# Patient Record
Sex: FEMALE | Race: WHITE | Employment: FULL TIME | URBAN - METROPOLITAN AREA
[De-identification: names, ages, dates, MRNs, and addresses within clinical notes are randomized per-mention and may not be internally consistent; named-entity substitution may affect disease eponyms.]

---

## 2017-03-31 ENCOUNTER — OFFICE VISIT (OUTPATIENT)
Dept: FAMILY MEDICINE | Facility: CLINIC | Age: 52
End: 2017-03-31

## 2017-03-31 VITALS
HEART RATE: 59 BPM | SYSTOLIC BLOOD PRESSURE: 112 MMHG | BODY MASS INDEX: 22.63 KG/M2 | WEIGHT: 136 LBS | OXYGEN SATURATION: 98 % | DIASTOLIC BLOOD PRESSURE: 72 MMHG

## 2017-03-31 DIAGNOSIS — N89.8 VAGINAL ITCHING: ICD-10-CM

## 2017-03-31 DIAGNOSIS — K62.89 RECTAL IRRITATION: ICD-10-CM

## 2017-03-31 DIAGNOSIS — R30.0 DYSURIA: Primary | ICD-10-CM

## 2017-03-31 LAB
BILIRUBIN UR: NEGATIVE
BLOOD UR: ABNORMAL
GLUCOSE URINE: NEGATIVE
KETONES UR QL: NEGATIVE
LEUKOCYTE ESTERASE UR: ABNORMAL
NITRITE UR QL STRIP: NEGATIVE
PH UR STRIP: 5.5 [PH] (ref 5–7)
PROTEIN UR: NEGATIVE
SP GR UR STRIP: 1.02
UROBILINOGEN UR STRIP-ACNC: ABNORMAL

## 2017-03-31 RX ORDER — NYSTATIN 100000 U/G
OINTMENT TOPICAL 2 TIMES DAILY
Qty: 30 G | Refills: 1 | Status: SHIPPED | OUTPATIENT
Start: 2017-03-31 | End: 2018-05-01

## 2017-03-31 RX ORDER — FLUCONAZOLE 150 MG/1
150 TABLET ORAL ONCE
Qty: 1 TABLET | Refills: 1 | Status: SHIPPED | OUTPATIENT
Start: 2017-03-31 | End: 2017-03-31

## 2017-03-31 ASSESSMENT — PAIN SCALES - GENERAL: PAINLEVEL: NO PAIN (0)

## 2017-03-31 NOTE — NURSING NOTE
Chief Complaint   Patient presents with     Hemorrhoids     Vaginal Itching     52 year old      Blood pressure 112/72, pulse 59, weight 136 lb (61.7 kg), SpO2 98 %. Body mass index is 22.63 kg/(m^2).    Wt Readings from Last 2 Encounters:   03/31/17 136 lb (61.7 kg)   04/22/16 136 lb (61.7 kg)     BP Readings from Last 3 Encounters:   03/31/17 112/72   04/22/16 104/69   04/04/14 116/74       Patient Active Problem List   Diagnosis     Lentigo maligna (H)     IUD (intrauterine device) in place     Benign neoplasm of choroid       Current Outpatient Prescriptions   Medication Sig Dispense Refill     VITAMIN D, CHOLECALCIFEROL, PO Take by mouth daily       tretinoin (RETIN-A) 0.025 % cream daily 135 g 12     fish oil-omega-3 fatty acids (FISH OIL) 1000 MG capsule Take 1 capsule by mouth daily.       levonorgestrel (MIRENA) 20 MCG/24HR IUD Use as directed       Multiple Vitamin (MULTIVITAMIN) per tablet Take 1 tablet by mouth daily.         Social History   Substance Use Topics     Smoking status: Never Smoker     Smokeless tobacco: Never Used     Alcohol use Not on file         Health Maintenance Due   Topic Date Due     HEPATITIS C SCREENING  01/09/1982     PAP SCREENING Q3 YR (SYSTEM ASSIGNED)  04/04/2017       BERONICA FRANK CMA  March 31, 2017 4:25 PM

## 2017-03-31 NOTE — PROGRESS NOTES
SUBJECTIVE: 52 year old female who presents with vaginal symptoms:   HPI:  Concerns:    - hemorrhoids have been bothersome and irritated for two weeks.  No constipation. No clearing with cortisone cream. Now vaginal symptoms started about the same time with some itching and irritation - used OTC monistat X 2 with some relief.   - also some urinary symptoms with burning over the sensitive skin   LMP: none since Mirena .  FSH  55/ Estradiol 21.   Review Of Systems:   Eyes: negative  Ears/Nose/Throat: negative  Respiratory: no  Cardiovascular: negative  Gastrointestinal: negative  Genitourinary: negative  Musculoskeletal: negative  Neurologic: negative  Psychiatric: negative  Hematologic/Lymphatic/Immunologic: negative  Endocrine: night sweats   PAST OB/GYN HISTORY:  .  [facility] place for bleeding control ~ .    HCM: Mammogram 2015/colonoscopy 2014  Past Medical History:   Diagnosis Date     Lentigo maligna (H)      Past Surgical History:   Procedure Laterality Date      SECTION       MOHS MICROGRAPHIC PROCEDURE       REPAIR MOHS       Medication     clindamycin (CLEOCIN-T) 1 % external solution     tretinoin (RETIN-A) 0.025 % cream     fish oil-omega-3 fatty acids (FISH OIL) 1000 MG capsule     levonorgestrel (MIRENA) 20 MCG/24HR IUD     Multiple Vitamin (MULTIVITAMIN) per tablet   Social History:  Three children ages 22, 20, 17.   Works full time and stressful.  Project development.   Family History:   No Breast or ovarian disease.  Mother with hyperlipidemia  PHYSICAL EXAM:  Constitutional: Well appearing woman in no acute distress.  Psychological: appropriate mood.  Genitourinary: External genitalia is normal appearance - minimal no discharge.  No enlargement of the [place] or [place] glands.  Rectal area is eythematous. Perineum without lesions.   Lymphatic: no lymphadenopathy.  Musculoskeletal: full range of motion    Skin: no concerning lesions, no jaundice.  Neurological: normal gait, no  tremor.   Diagnoses and associated orders for this visit:Rectal irritation     - Ointment: one part nizoral ointment, aquaphor and hydorcoritosne ointment. .   Vaginal itching     - diflucan 150 mg X one.   Perimenopausal  -    Will keep the IUD in place.  Recheck FSH   -    Will remove IUD next year.

## 2017-03-31 NOTE — MR AVS SNAPSHOT
After Visit Summary   3/31/2017    Jesus Anglin    MRN: [id number]           Patient Information     Date Of Birth          1965        Visit Information        Provider Department      3/31/2017 4:20 PM Faye Beck MD [facility]        Today's Diagnoses     Dysuria    -  1    Rectal irritation        Vaginal itching           Follow-ups after your visit        Who to contact     Please call your clinic at [phone] to:    Ask questions about your health    Make or cancel appointments    Discuss your medicines    Learn about your test results    Speak to your doctor   If you have compliments or concerns about an experience at your clinic, or if you wish to file a complaint, please contact [facility] Patient Relations at [phone] or email us at [email or web address]         Additional Information About Your Visit        MyChart Information     [employer] gives you secure access to your electronic health record. If you see a primary care provider, you can also send messages to your care team and make appointments. If you have questions, please call your primary care clinic.  If you do not have a primary care provider, please call [phone] and they will assist you.      [employer] is an electronic gateway that provides easy, online access to your medical records. With [employer], you can request a clinic appointment, read your test results, renew a prescription or communicate with your care team.     To access your existing account, please contact your [facility] or call [phone] for assistance.        Care EveryWhere ID     This is your Care EveryWhere ID. This could be used by other organizations to access your [place] medical records  [id number]        Your Vitals Were     Pulse Pulse Oximetry BMI (Body Mass Index)             59 98% 22.63 kg/m2          Blood Pressure from Last 3 Encounters:   03/31/17  112/72   04/22/16 104/69   04/04/14 116/74    Weight from Last 3 Encounters:   03/31/17 136 lb (61.7 kg)   04/22/16 136 lb (61.7 kg)   11/19/14 134 lb 3.2 oz (60.9 kg)              We Performed the Following     Basic metabolic panel     Follicle stimulating hormone     Urinalysis ([place])          Today's Medication Changes          These changes are accurate as of: 3/31/17  5:35 PM.  If you have any questions, ask your nurse or doctor.               Start taking these medicines.        Dose/Directions    fluconazole 150 MG tablet   Commonly known as:  DIFLUCAN   Used for:  Dysuria   Started by:  Faye Beck MD        Dose:  150 mg   Take 1 tablet (150 mg) by mouth once for 1 dose   Quantity:  1 tablet   Refills:  1       nystatin ointment   Commonly known as:  MYCOSTATIN   Used for:  Dysuria   Started by:  Faye Beck MD        Apply topically 2 times daily   Quantity:  30 g   Refills:  1            Where to get your medicines      These medications were sent to [place] [address] [place] [address]     Phone:  [phone]     fluconazole 150 MG tablet    nystatin ointment                Primary Care Provider Office Phone # Fax #    Faye Beck [phone]       [facility] [address] [address]        Thank you!     Thank you for choosing [facility]  for your care. Our goal is always to provide you with excellent care. Hearing back from our patients is one way we can continue to improve our services. Please take a few minutes to complete the written survey that you may receive in the mail after your visit with us. Thank you!             Your Updated Medication List - Protect others around you: Learn how to safely use, store and throw away your medicines at www.disposemymeds.org.          This list is accurate as of: 3/31/17  5:35 PM.  Always use your most recent med  list.                   Brand Name Dispense Instructions for use    fish oil-omega-3 fatty acids 1000 MG capsule      Take 1 capsule by mouth daily.       fluconazole 150 MG tablet    DIFLUCAN    1 tablet    Take 1 tablet (150 mg) by mouth once for 1 dose       MIRENA (52 MG) 20 MCG/24HR IUD   Generic drug:  levonorgestrel      Use as directed       multivitamin per tablet      Take 1 tablet by mouth daily.       nystatin ointment    MYCOSTATIN    30 g    Apply topically 2 times daily       tretinoin 0.025 % cream    RETIN-A    135 g    daily       VITAMIN D (CHOLECALCIFEROL) PO      Take by mouth daily

## 2017-04-01 LAB
ANION GAP SERPL CALCULATED.3IONS-SCNC: 7 MMOL/L (ref 3–14)
BUN SERPL-MCNC: 15 MG/DL (ref 7–30)
CALCIUM SERPL-MCNC: 8.7 MG/DL (ref 8.5–10.1)
CHLORIDE SERPL-SCNC: 104 MMOL/L (ref 94–109)
CO2 SERPL-SCNC: 30 MMOL/L (ref 20–32)
CREAT SERPL-MCNC: 0.73 MG/DL (ref 0.52–1.04)
FSH SERPL-ACNC: 53 IU/L
GFR SERPL CREATININE-BSD FRML MDRD: 83 ML/MIN/1.7M2
GLUCOSE SERPL-MCNC: 84 MG/DL (ref 70–99)
POTASSIUM SERPL-SCNC: 4.2 MMOL/L (ref 3.4–5.3)
SODIUM SERPL-SCNC: 141 MMOL/L (ref 133–144)

## 2017-05-30 ENCOUNTER — OFFICE VISIT (OUTPATIENT)
Dept: FAMILY MEDICINE | Facility: CLINIC | Age: 52
End: 2017-05-30

## 2017-05-30 VITALS
BODY MASS INDEX: 22.3 KG/M2 | HEART RATE: 60 BPM | DIASTOLIC BLOOD PRESSURE: 71 MMHG | SYSTOLIC BLOOD PRESSURE: 110 MMHG | TEMPERATURE: 97.8 F | OXYGEN SATURATION: 96 % | WEIGHT: 134 LBS

## 2017-05-30 DIAGNOSIS — Z71.84 TRAVEL ADVICE ENCOUNTER: Primary | ICD-10-CM

## 2017-05-30 RX ORDER — AZITHROMYCIN 500 MG/1
500 TABLET, FILM COATED ORAL DAILY
Qty: 3 TABLET | Refills: 0 | Status: SHIPPED | OUTPATIENT
Start: 2017-05-30 | End: 2018-07-09

## 2017-05-30 ASSESSMENT — PAIN SCALES - GENERAL: PAINLEVEL: NO PAIN (0)

## 2017-05-30 NOTE — NURSING NOTE
52 year old  No chief complaint on file.      Blood pressure 110/71, pulse 60, temperature 97.8  F (36.6  C), temperature source Oral, weight 134 lb (60.8 kg), SpO2 96 %. Body mass index is 22.3 kg/(m^2).  Patient Active Problem List   Diagnosis     Lentigo maligna (H)     IUD (intrauterine device) in place     Benign neoplasm of choroid       Wt Readings from Last 2 Encounters:   05/30/17 134 lb (60.8 kg)   03/31/17 136 lb (61.7 kg)     BP Readings from Last 3 Encounters:   05/30/17 110/71   03/31/17 112/72   04/22/16 104/69         Current Outpatient Prescriptions   Medication     VITAMIN D, CHOLECALCIFEROL, PO     nystatin (MYCOSTATIN) ointment     tretinoin (RETIN-A) 0.025 % cream     fish oil-omega-3 fatty acids (FISH OIL) 1000 MG capsule     levonorgestrel (MIRENA) 20 MCG/24HR IUD     Multiple Vitamin (MULTIVITAMIN) per tablet     No current facility-administered medications for this visit.        Social History   Substance Use Topics     Smoking status: Never Smoker     Smokeless tobacco: Never Used     Alcohol use Not on file       Health Maintenance Due   Topic Date Due     HEPATITIS C SCREENING  01/09/1982     PAP SCREENING Q3 YR (SYSTEM ASSIGNED)  04/04/2017       Lab Results   Component Value Date    PAP NIL 04/04/2014         May 30, 2017 3:52 PM

## 2017-05-30 NOTE — MR AVS SNAPSHOT
After Visit Summary   5/30/2017    Jesus Anglin    MRN: [id number]           Patient Information     Date Of Birth          1965        Visit Information        Provider Department      5/30/2017 3:40 PM Aziza Ball PA-C [facility]        Today's Diagnoses     Travel advice encounter    -  1      Care Instructions      Traveler's Diarrhea (Adult)    Traveler's diarrhea is an infection in the intestinal tract caused by bacteria called E coli. This bacteria is commonly found in water supplies of developing countries. The local people of those countries are used to E coli in the water and don't get sick. Tourists who drink contaminated water or eat foods that were washed or prepared with this water may become very ill.  The illness begins 1 to 3 days after exposure and lasts up to 5 days. Symptoms include fever, vomiting, stomach cramping and watery diarrhea. There may also be blood or mucus in the stool. Mild cases will get better without treatment. Antibiotics are used for more severe cases.  Home care    If you were prescribed antibiotics,  take them until they are finished.    You may use acetaminophen or ibuprofen to control fever, unless another medicine was prescribed. If you have chronic liver or kidney disease or have ever had a stomach ulcer or gastrointestinal  bleeding, talk with the doctor before using these medicines. Aspirin should never be used in anyone under 18 years of age who is ill with a fever. It may cause severe illness or death.    Do not take over-the-counter anti-diarrheal medicines, unless advised by the doctor.     Once vomiting stops, follow these guidelines:     During the first 12 to 24 hours, follow the diet below:    Fruit juices. Apple, grape and cranberry juice; clear fruit drinks, electrolyte replacement and sports drinks.    Beverages. Soft drinks without caffeine; mineral water (plain or flavored); decaffeinated tea and coffee    Soups. Clear  broth, consommé and bouillon.    Desserts. Plain gelatin, popsicles and fruit juice bars; As you feel better, you may add 6 to 8 oz of yogurt per day.  During the next 24 hours, you may add the following to the above:    Hot cereal, plain toast, bread, rolls, crackers    Plain noodles, rice, mashed potatoes, chicken noodle or rice soup    Unsweetened canned fruit (avoid pineapple), bananas    Limit fat intake to less than 15 grams per day by avoiding margarine, butter, oils, mayonnaise, sauces, gravies, fried foods, peanut butter, meat, poultry and fish.    Limit fiber; avoid raw or cooked vegetables, fresh fruits (except bananas) and bran cereals.    Limit caffeine and chocolate. No spices or seasonings except salt.  During the next 24 hours you can gradually resume a normal diet as the symptoms lessen.  Follow-up care  Follow up with your healthcare provider, or as advised. Call if you are not improving within 24 hours or if the diarrhea lasts more than 1 week on antibiotics. If a stool (diarrhea) sample was taken, you may call in 2 days (or as directed) for the results.  When to seek medical advice  Call your healthcare provider if any of these occur:    Severe constant pain in the lower part of the abdomen, on the right side    Blood in diarrhea or vomit, dark coffee ground appearing vomit, or dark tarry stools    continued vomiting (unable to keep liquids down)    Frequent diarrhea (more than 5 times a day); blood (red or black) or mucus in diarrhea    Reduced oral intake    Reduced urine output or extreme thirst    Weakness, dizziness, fainting    Drowsiness, confusion, stiff neck, or seizure    Fever (1 degree above your normal temperature) lasting 24 to 48 hours, or whatever your healthcare provider told you to report based on your condition    4664-9889 [employer]. [address] [place] [address], [place] [address]. All rights reserved. This information is not intended as a substitute for professional  medical care. Always follow your healthcare professional's instructions.        Healthy Eating While Traveling  Eating a healthy diet when you are traveling can be difficult. It is important to plan ahead. Below are strategies to help you.    Hotels    Order healthy breakfast foods individually (dimitri man). This will help you avoid the temptations at the breakfast buffet.    Have breakfast in your room. Bring fresh fruit and mini-boxes of cereal. You can keep a small container of  low-fat or skim milk cool in the ice bucket overnight.  Car travel  If you have a long ride ahead, buy snacks before the trip. This will help you avoid the high-fat food choices on the road. Some good snack options include:    Pre-washed fruit (apples, pears, bananas, or grapes)    Whole-grain crackers or pretzels    Whole-grain bagels    Plain popcorn    Fig bars    Low-fat or nonfat yogurt    Baby carrots or ready-cut raw veggies    Dried fruit    String cheese    Unsalted, raw nuts or seeds    Homemade trail mix  Airplane travel    Think ahead. Pack healthy snacks that you can carry through security. Most of the items listed above can be packed in your carry-on bag.    If you are not able to pack healthy options, buy healthy food at the terminal and bring it on the plane. In most airports you can find whole-grain sandwiches, pre-made salads, fruit cups, pre-cut veggies, cheese sticks, and fat-free lattes.    Many airlines offer peanuts or pretzels. These are already pre-portioned to limit your serving. Pretzels have fewer calories. But peanuts have protein and healthy fats that may satisfy your hunger longer.    Be smart when choosing a drink. Have water, low-fat milk, vegetable juice, or whole fruit juice instead of soda.    Limit the amount of alcohol you drink on the plane. Alcohol provides calories with no nutrients. It can make you hungry and dehydrated. Try mineral water with a twist of lemon or lime instead.       1547-0315 The  [employer]. [address] [place] [address], [place] [address]. All rights reserved. This information is not intended as a substitute for professional medical care. Always follow your healthcare professional's instructions.                Follow-ups after your visit        Your next 10 appointments already scheduled     Aug 18, 2017 10:40 AM CDT   PHYSICAL with Faye Beck MD   [facility]    [address], [facility]  [facility] [address]   [phone]              Who to contact     Please call your clinic at [phone] to:    Ask questions about your health    Make or cancel appointments    Discuss your medicines    Learn about your test results    Speak to your doctor   If you have compliments or concerns about an experience at your clinic, or if you wish to file a complaint, please contact [facility] Patient Relations at [phone] or email us at [email or web address]         Additional Information About Your Visit        efw-suhlhar[employer]ivot Information     [employer] gives you secure access to your electronic health record. If you see a primary care provider, you can also send messages to your care team and make appointments. If you have questions, please call your primary care clinic.  If you do not have a primary care provider, please call [phone] and they will assist you.      [employer] is an electronic gateway that provides easy, online access to your medical records. With [employer], you can request a clinic appointment, read your test results, renew a prescription or communicate with your care team.     To access your existing account, please contact your [facility] or call [phone] for assistance.        Care EveryWhere ID     This is your Care EveryWhere ID. This could be used by other organizations to access your [place] medical records  [id number]        Your Vitals  Were     Pulse Temperature Pulse Oximetry BMI (Body Mass Index)          60 97.8  F (36.6  C) (Oral) 96% 22.3 kg/m2         Blood Pressure from Last 3 Encounters:   05/30/17 110/71   03/31/17 112/72   04/22/16 104/69    Weight from Last 3 Encounters:   05/30/17 134 lb (60.8 kg)   03/31/17 136 lb (61.7 kg)   04/22/16 136 lb (61.7 kg)              Today, you had the following     No orders found for display         Today's Medication Changes          These changes are accurate as of: 5/30/17  4:06 PM.  If you have any questions, ask your nurse or doctor.               Start taking these medicines.        Dose/Directions    azithromycin 500 MG tablet   Commonly known as:  ZITHROMAX   Used for:  Travel advice encounter   Started by:  Aziza Ball PA-C        Dose:  500 mg   Take 1 tablet (500 mg) by mouth daily Use for 1-3 days to treat severe diarrhea associated with travel   Quantity:  3 tablet   Refills:  0            Where to get your medicines      These medications were sent to [place] [address] [place] [address]     Phone:  [phone]     azithromycin 500 MG tablet                Primary Care Provider Office Phone # Fax #    Faye Beck [phone]       [facility] [address] [facility] [address]  [facility] [address]        Thank you!     Thank you for choosing [facility]  for your care. Our goal is always to provide you with excellent care. Hearing back from our patients is one way we can continue to improve our services. Please take a few minutes to complete the written survey that you may receive in the mail after your visit with us. Thank you!             Your Updated Medication List - Protect others around you: Learn how to safely use, store and throw away your medicines at www.disposemymeds.org.          This list is accurate as of: 5/30/17  4:06 PM.  Always use your most recent med list.                    Brand Name Dispense Instructions for use    azithromycin 500 MG tablet    ZITHROMAX    3 tablet    Take 1 tablet (500 mg) by mouth daily Use for 1-3 days to treat severe diarrhea associated with travel       fish oil-omega-3 fatty acids 1000 MG capsule      Take 1 capsule by mouth daily.       MIRENA (52 MG) 20 MCG/24HR IUD   Generic drug:  levonorgestrel      Use as directed       multivitamin per tablet      Take 1 tablet by mouth daily.       nystatin ointment    MYCOSTATIN    30 g    Apply topically 2 times daily       tretinoin 0.025 % cream    RETIN-A    135 g    daily       VITAMIN D (CHOLECALCIFEROL) PO      Take by mouth daily

## 2017-05-30 NOTE — PATIENT INSTRUCTIONS
Traveler's Diarrhea (Adult)    Traveler's diarrhea is an infection in the intestinal tract caused by bacteria called E coli. This bacteria is commonly found in water supplies of developing countries. The local people of those countries are used to E coli in the water and don't get sick. Tourists who drink contaminated water or eat foods that were washed or prepared with this water may become very ill.  The illness begins 1 to 3 days after exposure and lasts up to 5 days. Symptoms include fever, vomiting, stomach cramping and watery diarrhea. There may also be blood or mucus in the stool. Mild cases will get better without treatment. Antibiotics are used for more severe cases.  Home care    If you were prescribed antibiotics,  take them until they are finished.    You may use acetaminophen or ibuprofen to control fever, unless another medicine was prescribed. If you have chronic liver or kidney disease or have ever had a stomach ulcer or gastrointestinal  bleeding, talk with the doctor before using these medicines. Aspirin should never be used in anyone under 18 years of age who is ill with a fever. It may cause severe illness or death.    Do not take over-the-counter anti-diarrheal medicines, unless advised by the doctor.     Once vomiting stops, follow these guidelines:     During the first 12 to 24 hours, follow the diet below:    Fruit juices. Apple, grape and cranberry juice; clear fruit drinks, electrolyte replacement and sports drinks.    Beverages. Soft drinks without caffeine; mineral water (plain or flavored); decaffeinated tea and coffee    Soups. Clear broth, consommé and bouillon.    Desserts. Plain gelatin, popsicles and fruit juice bars; As you feel better, you may add 6 to 8 oz of yogurt per day.  During the next 24 hours, you may add the following to the above:    Hot cereal, plain toast, bread, rolls, crackers    Plain noodles, rice, mashed potatoes, chicken noodle or rice soup    Unsweetened  canned fruit (avoid pineapple), bananas    Limit fat intake to less than 15 grams per day by avoiding margarine, butter, oils, mayonnaise, sauces, gravies, fried foods, peanut butter, meat, poultry and fish.    Limit fiber; avoid raw or cooked vegetables, fresh fruits (except bananas) and bran cereals.    Limit caffeine and chocolate. No spices or seasonings except salt.  During the next 24 hours you can gradually resume a normal diet as the symptoms lessen.  Follow-up care  Follow up with your healthcare provider, or as advised. Call if you are not improving within 24 hours or if the diarrhea lasts more than 1 week on antibiotics. If a stool (diarrhea) sample was taken, you may call in 2 days (or as directed) for the results.  When to seek medical advice  Call your healthcare provider if any of these occur:    Severe constant pain in the lower part of the abdomen, on the right side    Blood in diarrhea or vomit, dark coffee ground appearing vomit, or dark tarry stools    continued vomiting (unable to keep liquids down)    Frequent diarrhea (more than 5 times a day); blood (red or black) or mucus in diarrhea    Reduced oral intake    Reduced urine output or extreme thirst    Weakness, dizziness, fainting    Drowsiness, confusion, stiff neck, or seizure    Fever (1 degree above your normal temperature) lasting 24 to 48 hours, or whatever your healthcare provider told you to report based on your condition    9637-0881 [employer]. [address] [place] [address]. All rights reserved. This information is not intended as a substitute for professional medical care. Always follow your healthcare professional's instructions.        Healthy Eating While Traveling  Eating a healthy diet when you are traveling can be difficult. It is important to plan ahead. Below are strategies to help you.    Hotels    Order healthy breakfast foods individually (dimitri man). This will help you avoid the temptations  at the breakfast buffet.    Have breakfast in your room. Bring fresh fruit and mini-boxes of cereal. You can keep a small container of  low-fat or skim milk cool in the ice bucket overnight.  Car travel  If you have a long ride ahead, buy snacks before the trip. This will help you avoid the high-fat food choices on the road. Some good snack options include:    Pre-washed fruit (apples, pears, bananas, or grapes)    Whole-grain crackers or pretzels    Whole-grain bagels    Plain popcorn    Fig bars    Low-fat or nonfat yogurt    Baby carrots or ready-cut raw veggies    Dried fruit    String cheese    Unsalted, raw nuts or seeds    Homemade trail mix  Airplane travel    Think ahead. Pack healthy snacks that you can carry through security. Most of the items listed above can be packed in your carry-on bag.    If you are not able to pack healthy options, buy healthy food at the terminal and bring it on the plane. In most airports you can find whole-grain sandwiches, pre-made salads, fruit cups, pre-cut veggies, cheese sticks, and fat-free lattes.    Many airlines offer peanuts or pretzels. These are already pre-portioned to limit your serving. Pretzels have fewer calories. But peanuts have protein and healthy fats that may satisfy your hunger longer.    Be smart when choosing a drink. Have water, low-fat milk, vegetable juice, or whole fruit juice instead of soda.    Limit the amount of alcohol you drink on the plane. Alcohol provides calories with no nutrients. It can make you hungry and dehydrated. Try mineral water with a twist of lemon or lime instead.       8361-0178 [employer]. [address] [place] [address], [place] [address]. All rights reserved. This information is not intended as a substitute for professional medical care. Always follow your healthcare professional's instructions.

## 2017-05-30 NOTE — NURSING NOTE
52 year old  Travel vist  Blood pressure 110/71, pulse 60, temperature 97.8  F (36.6  C), temperature source Oral, weight 134 lb (60.8 kg), SpO2 96 %. Body mass index is 22.3 kg/(m^2).  Patient Active Problem List   Diagnosis     Lentigo maligna (H)     IUD (intrauterine device) in place     Benign neoplasm of choroid       Wt Readings from Last 2 Encounters:   05/30/17 134 lb (60.8 kg)   03/31/17 136 lb (61.7 kg)     BP Readings from Last 3 Encounters:   05/30/17 110/71   03/31/17 112/72   04/22/16 104/69         Current Outpatient Prescriptions   Medication     VITAMIN D, CHOLECALCIFEROL, PO     nystatin (MYCOSTATIN) ointment     tretinoin (RETIN-A) 0.025 % cream     fish oil-omega-3 fatty acids (FISH OIL) 1000 MG capsule     levonorgestrel (MIRENA) 20 MCG/24HR IUD     Multiple Vitamin (MULTIVITAMIN) per tablet     No current facility-administered medications for this visit.        Social History   Substance Use Topics     Smoking status: Never Smoker     Smokeless tobacco: Never Used     Alcohol use Not on file       Health Maintenance Due   Topic Date Due     HEPATITIS C SCREENING  01/09/1982     PAP SCREENING Q3 YR (SYSTEM ASSIGNED)  04/04/2017       Lab Results   Component Value Date    PAP NIL 04/04/2014         May 30, 2017 3:51 PM

## 2017-05-30 NOTE — PROGRESS NOTES
Itinerary:  [place] for pleasure.  Staying in [facility] in hotel and resort.  Will be going to the beach.  No jungle travel.  Gone for 6 days.    Departure Date: 08/03/2017    IMMUNIZATION HISTORY  Have you ever fainted from having your blood drawn or from an injection?  No  Have you ever had a fever reaction to vaccination?  No  Have you ever had any bad reaction or side effect from any vaccination?  Tightness in arm from the flu shot  Have you ever had hepatitis A or B vaccine?  not sure  Do you live (or work closely) with anyone who has AIDS, an AIDS-like condition, any other immune disorder or who is on chemotherapy for cancer?  Yes  Do you have a family history of immunodeficiency?  No  Have you received any injection of immune globulin or any blood products during the past 12 months?  No    GENERAL MEDICAL HISTORY  Do you have a medical condition that warrants maintenance medication or physician follow-up?  No  Do you have a medical condition that is stable now, but that may recur while traveling?  No  Have you had a fever in the past 48 hours?  No  Are you pregnant, or might you become pregnant on this trip?  No  Do you have AIDS, an AIDS-like condition, any other immune disorder, leukemia or cancer?  No  Do you have severe thrombocytopenia (low platelet count) or a coagulation disorder?  No  Have you ever had a convulsion, seizure, epilepsy, neurologic condition or brain infection?  No  Do you have any stomach conditions?  No  Do you have a G6PD deficiency?  No  Do you have bowel conditions such as diarrhea or constipation?  No  Have you ever had hepatitis or yellow jaundice?  No  Do you have a history of psychiatric problems?  No  Do you have a problem with strange dreams and/or nightmares?  No  Do you have insomnia?  No  Do you have problems with vaginitis?  No  Do you have psoriasis?  No  Do you have any eye conditions?  Yes  Are you prone to motion sickness?  Yes  Have you or a member of your house hold  ever been diagnosed with eczema or atopic dermatitis (e.g., itchy, red, scaly rash lasting > 2 weeks that often comes and goes)? No  Cardiac disease, with or without symptoms? No         Subjective:  Patient here for immunizations prior to traveling to [place].  Patient denies any pain. symptoms of fever, cough or URI.  Itinerary and travel questionnaire reviewed.    Objective:  Travel itinerary and immunization history completed.  /71  Pulse 60  Temp 97.8  F (36.6  C) (Oral)  Wt 134 lb (60.8 kg)  SpO2 96%  BMI 22.3 kg/m2  EXAM:  Constitutional: healthy, alert and no distress   Cardiovascular: negative, PMI normal. No lifts, heaves, or thrills. RRR. No murmurs, clicks gallops or rub  Respiratory: negative, Percussion normal. Good diaphragmatic excursion. Lungs clear      Assessment:    ICD-10-CM    1. Travel advice encounter Z71.89 azithromycin (ZITHROMAX) 500 MG tablet     HEPA VACCINE ADULT IM [09573]     HEPA VACCINE ADULT IM [88727]     TYPHOID VACCINE, IM [79287]       International travel medical questionnaire completed.  Ok to give vaccines.  Plan:  Zithromax 500mg is given for travelers diarrhea per protocol.  Mosquito bite prevention  And mosquito borne illnesses were discussed.  Safe water recommendations were made as well as information sheet. Strongly encourage patient to visit the CDC.gov website for travel recommendations.  Updated typhoid and have a vaccines today. Second hepatitis A vaccine to be given in six months on the nurse schedule  . All questions answered and encouraged.  Patient education reviewed and given to Jesus.  Post Travel Period sheet discussed.  Jesus advised to stay after injection per protocol.  Return in 6 months for second hep A vaccine/

## 2017-05-30 NOTE — NURSING NOTE
Screening Questionnaire for Adult Immunization    Are you sick today?   No   Do you have allergies to medications, food, a vaccine component or latex?   No   Have you ever had a serious reaction after receiving a vaccination?   No   Do you have a long-term health problem with heart disease, lung disease, asthma, kidney disease, metabolic disease (e.g. diabetes), anemia, or other blood disorder?   No   Do you have cancer, leukemia, HIV/AIDS, or any other immune system problem?   No   In the past 3 months, have you taken medications that affect  your immune system, such as prednisone, other steroids, or anticancer drugs; drugs for the treatment of rheumatoid arthritis, Crohn s disease, or psoriasis; or have you had radiation treatments?   No   Have you had a seizure, or a brain or other nervous system problem?   No   During the past year, have you received a transfusion of blood or blood     products, or been given immune (gamma) globulin or antiviral drug?   No   For women: Are you pregnant or is there a chance you could become        pregnant during the next month?   No   Have you received any vaccinations in the past 4 weeks?   No     Immunization questionnaire answers were all negative.      Given by Mary Carmen Julio. Patient instructed to remain in clinic for 20 minutes afterwards, and to report any adverse reaction to me immediately.       Screening performed by Mary Carmen Julio on 5/30/2017 at 4:19 PM.

## 2017-07-29 ENCOUNTER — HEALTH MAINTENANCE LETTER (OUTPATIENT)
Age: 52
End: 2017-07-29

## 2017-08-18 ENCOUNTER — OFFICE VISIT (OUTPATIENT)
Dept: FAMILY MEDICINE | Facility: CLINIC | Age: 52
End: 2017-08-18

## 2017-08-18 VITALS
OXYGEN SATURATION: 99 % | DIASTOLIC BLOOD PRESSURE: 71 MMHG | TEMPERATURE: 97.4 F | HEART RATE: 56 BPM | BODY MASS INDEX: 22.64 KG/M2 | SYSTOLIC BLOOD PRESSURE: 116 MMHG | WEIGHT: 136.08 LBS

## 2017-08-18 DIAGNOSIS — Z78.0 POSTMENOPAUSAL STATUS: ICD-10-CM

## 2017-08-18 DIAGNOSIS — Z00.00 ANNUAL PHYSICAL EXAM: Primary | ICD-10-CM

## 2017-08-18 NOTE — MR AVS SNAPSHOT
"              After Visit Summary   8/18/2017    Jesus Anglin    MRN: [id number]           Patient Information     Date Of Birth          1965        Visit Information        Provider Department      8/18/2017 10:40 AM Faye Beck MD [facility]        Today's Diagnoses     Annual physical exam    -  1    Postmenopausal status           Follow-ups after your visit        Your next 10 appointments already scheduled     Aug 21, 2017  8:00 AM CDT   (Arrive by 7:45 AM)   MA SCREENING DIGITAL BILATERAL with [id number]   [facility] ([facility] [place])    [address]  2nd Floor  [facility] [address] [phone]           Do not use any powder, lotion or deodorant under your arms or on your breast. If you do, we will ask you to remove it before your exam.  Wear comfortable, two-piece clothing.  If you have any allergies, tell your care team.  Bring any previous mammograms from other facilities or have them mailed to the breast center. Three-dimensional (3D) mammograms are available at [place] locations in [facility], and [place]. [facility] locations include [place] and [facility] [place] in [place]. Benefits of 3D mammograms include: - Improved rate of cancer detection - Decreases your chance of having to go back for more tests, which means fewer: - \"False-positive\" results (This means that there is an abnormal area but it isn't cancer.) - Invasive testing procedures, such as a biopsy or surgery - Can provide clearer images of the breast if you have dense breast tissue. 3D mammography is an optional exam that anyone can have with a 2D mammogram. It doesn't replace or take the place of a 2D mammogram. 2D mammograms remain an effective screening test for all women.  Not all insurance companies cover the cost of a 3D mammogram. Check with your insurance.            Oct 09, 2017  8:00 AM CDT   (Arrive " by 7:45 AM)   Return Visit with Anna Quinn MD   [facility] ([facility] [place])    [address]  [address]rd [facility] [address] [phone]              Who to contact     Please call your clinic at [phone] to:    Ask questions about your health    Make or cancel appointments    Discuss your medicines    Learn about your test results    Speak to your doctor   If you have compliments or concerns about an experience at your clinic, or if you wish to file a complaint, please contact [facility] Patient Relations at [phone] or email us at [email or web address]         Additional Information About Your Visit        [employer] Interactive Information     [employer] gives you secure access to your electronic health record. If you see a primary care provider, you can also send messages to your care team and make appointments. If you have questions, please call your primary care clinic.  If you do not have a primary care provider, please call [phone] and they will assist you.      [employer] is an electronic gateway that provides easy, online access to your medical records. With [employer], you can request a clinic appointment, read your test results, renew a prescription or communicate with your care team.     To access your existing account, please contact your [facility] Clinic or call [phone] for assistance.        Care EveryWhere ID     This is your Care EveryWhere ID. This could be used by other organizations to access your [place] medical records  [id number]        Your Vitals Were     Pulse Temperature Pulse Oximetry BMI (Body Mass Index)          56 97.4  F (36.3  C) (Oral) 99% 22.64 kg/m2         Blood Pressure from Last 3 Encounters:   08/18/17 116/71   05/30/17 110/71   03/31/17 112/72    Weight from Last 3 Encounters:   08/18/17 136 lb 1.3 oz (61.7 kg)   05/30/17 134 lb (60.8 kg)   03/31/17 136 lb  (61.7 kg)              Today, you had the following     No orders found for display       Primary Care Provider Office Phone # Fax #    Faye Beck [phone]       [address] [address]        Equal Access to Services     PROMISEGREG MARJORIE : Hadii aad ku hadangelinao Soomaali, waaxda luqadaha, qaybta kaalmada adeegyada, waxpauline beverlyin kalpeshn adekathleen chery lalewilda temple. So [facility] [phone].    ATENCIÓN: Si habla español, tiene a albarado disposición servicios gratuitos de asistencia lingüística. Llame al [phone].    We comply with applicable federal civil rights laws and [place] laws. We do not discriminate on the basis of race, color, national origin, age, disability sex, sexual orientation or gender identity.            Thank you!     Thank you for choosing [facility]  for your care. Our goal is always to provide you with excellent care. Hearing back from our patients is one way we can continue to improve our services. Please take a few minutes to complete the written survey that you may receive in the mail after your visit with us. Thank you!             Your Updated Medication List - Protect others around you: Learn how to safely use, store and throw away your medicines at www.disposemymeds.org.          This list is accurate as of: 8/18/17 11:18 AM.  Always use your most recent med list.                   Brand Name Dispense Instructions for use Diagnosis    azithromycin 500 MG tablet    ZITHROMAX    3 tablet    Take 1 tablet (500 mg) by mouth daily Use for 1-3 days to treat severe diarrhea associated with travel    Travel advice encounter       fish oil-omega-3 fatty acids 1000 MG capsule      Take 1 capsule by mouth daily.        MIRENA (52 MG) 20 MCG/24HR IUD   Generic drug:  levonorgestrel      Use as directed        multivitamin per tablet      Take 1 tablet by mouth daily.        nystatin ointment    MYCOSTATIN    30 g    Apply topically 2 times daily    Dysuria        tretinoin 0.025 % cream    RETIN-A    135 g    daily    Actinic skin damage       VITAMIN D (CHOLECALCIFEROL) PO      Take by mouth daily

## 2017-08-18 NOTE — NURSING NOTE
"Jesus Anglin's goals for this visit include: CC  She requests these members of her care team be copied on today's visit information: No    PCP: Faye Beck    Referring Provider:  Referred Self, MD  No address on file    Chief Complaint   Patient presents with     Physical       Initial /71 (BP Location: Left arm, Patient Position: Chair, Cuff Size: Adult Regular)  Pulse 56  Temp 97.4  F (36.3  C) (Oral)  Wt 136 lb 1.3 oz (61.7 kg)  SpO2 99%  BMI 22.64 kg/m2 Estimated body mass index is 22.64 kg/(m^2) as calculated from the following:    Height as of 4/22/16: 5' 5\" (165.1 cm).    Weight as of this encounter: 136 lb 1.3 oz (61.7 kg).  Medication Reconciliation: complete  "

## 2017-08-18 NOTE — PROGRESS NOTES
SUBJECTIVE: 52 year old female who presents for annual exam:   HPI:  Thinks she is postmenopausal with a FSH of 55 X two years.    Night sweats have resolved.     IUD mirena since    Review Of Systems  Skin: none  Eyes: negative  Ears/Nose/Throat: negative  Respiratory: none  Cardiovascular: negative  Gastrointestinal: negative  Genitourinary: negative  Musculoskeletal: negative  Neurologic: negative  Psychiatric: negative  Hematologic/Lymphatic/Immunologic: negative  Endocrine: night sweats   PAST OB/GYN HISTORY:  .  Mirena since .  No abnormal bleeding.  vasectomy.  HCM: Mammogram; 2017; colonoscopy 2014;   Exercise: regular exercise.   Past Medical History:   Diagnosis Date     Lentigo maligna (H)      Past Surgical History:   Procedure Laterality Date      SECTION       MOHS MICROGRAPHIC PROCEDURE       REPAIR MOHS       Medication     clindamycin (CLEOCIN-T) 1 % external solution     tretinoin (RETIN-A) 0.025 % cream     fish oil-omega-3 fatty acids (FISH OIL) 1000 MG capsule     levonorgestrel (MIRENA) 20 MCG/24HR IUD     Multiple Vitamin (MULTIVITAMIN) per tablet   Social History:  Three children 18 will go to the university. Two older sons are 23 and 21.  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   Family History:   No Breast or ovarian disease.  Mother with hyperlipidemia  PHYSICAL EXAM:  /71 (BP Location: Left arm, Patient Position: Chair, Cuff Size: Adult Regular)  Pulse 56  Temp 97.4  F (36.3  C) (Oral)  Wt 136 lb 1.3 oz (61.7 kg)  SpO2 99%  BMI 22.64 kg/m2  Constitutional: Well appearing woman in no acute distress.  Psychological: appropriate mood.  Eyes: anicteric, normal extra-ocular movements,  pupils are equal and reactive to light.   Ears, Nose and Throat: tympanic membranes clear, nose clear and free of lesions,  Neck supple with full range of motion.  No thyroidmegaly.   Cardiovascular: regular rate and rhythm,  normal S1 and S2, no murmurs, rubs or gallops, peripheral pulses full and symmetric   Respiratory: clear to auscultation, no wheezes or crackles, normal breath sounds.  Breast: Symmetrical without visible distortion or swelling. No masses noted. No nipple inversion, no breast dimpling or puckering. Axillary area without masses or lympadenapathy.   Gastrointestinal: positive bowel sounds, nontender, no hepatosplenomegaly, no masses. No guarding or rebound.  Genitourinary: External genitalia is normal appearance.  No enlargement of the Bartholin or Dent glands. Urethra and bladder are non-tender. Vagina is without lesions or discharge. Normal epithelium, no anterior or posterior wall defects. Cervix is smooth, without lesions, no cervical motion tenderness.  Uterus is normal size, shape, and contour. IUD removed.  Adnexa without tenderness or enlarged. Rectal exam with normal sphincter tone, no masses. Perineum without lesions.   Lymphatic: no lymphadenopathy.  Musculoskeletal: full range of motion    Skin: no concerning lesions, no jaundice.  Neurological: normal gait, no tremor.   Diagnoses and associated orders for this visit:  Well woman exam with routine gynecological exam  -    Annual well exam including breast and pelvic with pap/HPV [4/2014]   -    Mammogram up todate  -    Colonoscopy up todate  -    Second Hep A/B due November 2017  Menopausal  - IUD removed without difficulty  LABS FASTING NEXT YEAR 2018

## 2017-08-21 ENCOUNTER — RADIANT APPOINTMENT (OUTPATIENT)
Dept: MAMMOGRAPHY | Facility: CLINIC | Age: 52
End: 2017-08-21

## 2017-08-21 DIAGNOSIS — Z12.31 VISIT FOR SCREENING MAMMOGRAM: ICD-10-CM

## 2017-10-09 ENCOUNTER — OFFICE VISIT (OUTPATIENT)
Dept: DERMATOLOGY | Facility: CLINIC | Age: 52
End: 2017-10-09

## 2017-10-09 DIAGNOSIS — L57.8 ACTINIC SKIN DAMAGE: ICD-10-CM

## 2017-10-09 RX ORDER — TRETINOIN 0.25 MG/G
CREAM TOPICAL
Qty: 135 G | Refills: 12 | Status: SHIPPED | OUTPATIENT
Start: 2017-10-09 | End: 2020-11-02

## 2017-10-09 ASSESSMENT — PAIN SCALES - GENERAL: PAINLEVEL: NO PAIN (0)

## 2017-10-09 NOTE — PROGRESS NOTES
[facility] Dermatology Note    Dermatology Problem List:  1. Lentigo Maligna left nasal ala 2010, Mohs excision 2010 with reconstruction by plastics and surface treatments by Dr. Martinez in the year after surgery.  2. Choroidal nevi of the right eye, two: both benign, patient believes she was seen within the last year but is unsure of the date  3. Rosacea, tx with adapalene  4. Seborrheic Dermatitis  5. Dysplastic Nevus of the L lower buttock  6. Cherry angiomas    Encounter Date: Oct 9, 2017    CC:   Chief Complaint   Patient presents with     Derm Problem     Jesus is here today for a skin check.  States she has no areas of concern today.           History of Present Illness:  Ms. Jesus Anglin is a 52 year old female who with a history of skin cancer presents for annual examination.  She is doing well today with no new concerns. She has been using an spf 15 lotion on her face daily and an spf 50 foundation. She overall avoids the sun. She uses head and shoulders shampoo on occasion for dry, flaky scalp and that resolves it. She has been using tretinoin 0.025% cream every couple of days.  No other skin concerns today.    Past Medical History:   Patient Active Problem List   Diagnosis     Lentigo maligna (H)     Benign neoplasm of choroid     Postmenopausal status     Past Medical History:   Diagnosis Date     Lentigo maligna (H)      Past Surgical History:   Procedure Laterality Date      SECTION       MOHS MICROGRAPHIC PROCEDURE       REPAIR MOHS         Social History:  The patient works in [employer].    Family History:  There is a family history of non-melanoma skin cancer in the patient's mother.    Medications:  Current Outpatient Prescriptions   Medication Sig Dispense Refill     tretinoin (RETIN-A) 0.025 % cream daily 135 g 12     VITAMIN D, CHOLECALCIFEROL, PO Take by mouth daily       fish oil-omega-3 fatty acids (FISH OIL) 1000 MG capsule Take 1 capsule by mouth  daily.       Multiple Vitamin (MULTIVITAMIN) per tablet Take 1 tablet by mouth daily.       azithromycin (ZITHROMAX) 500 MG tablet Take 1 tablet (500 mg) by mouth daily Use for 1-3 days to treat severe diarrhea associated with travel (Patient not taking: Reported on 10/9/2017) 3 tablet 0     nystatin (MYCOSTATIN) ointment Apply topically 2 times daily (Patient not taking: Reported on 10/9/2017) 30 g 1     levonorgestrel (MIRENA) 20 MCG/24HR IUD Use as directed          Allergies   Allergen Reactions     Penicillin G Hives     Benzoyl Peroxide Swelling and Rash         Review of Systems:  -Constitutional: Once weekly night sweats.  Notes that she is in early menopause per her gyn.  The patient denies fatigue, fevers, chills, unintended weight loss.  -Skin: As above in HPI. No additional skin concerns.    Physical exam:  Vitals: There were no vitals taken for this visit.  GEN: This is a well developed, well-nourished female in no acute distress, in a pleasant mood.    SKIN: Total skin excluding the undergarment areas was performed. The exam included the head/face, neck, both arms, chest, back, abdomen, both legs, digits and/or nails. Buttocks also examined.  -Well healed scar on L nasal tip/ala.  Without pigment recurrence.  -Few light brown, symmetric macules on the face and sun exposed areas consistent with solar lentigines.  -Brown, waxy, stuck on papules on the back consistent with SKs.  -Many pink domed shaped papules on the trunk consistent with cherry angiomas.  -Toenails painted.  -No other lesions of concern on areas examined.   LYMPH NODES:  No submandibular, cervical, supraclavicular, axillary, or inguinal lymph adenopathy.    Impression/Plan:  1. History of lentigo maligna, L nose, Moh's excision 2010.    No evidence of recurrence.    Recommended continuation of yearly skin checks.    Recommended continued sun avoidance and sun screen use    2. Chorodial nevi    Following with ophthalmology yearly. Benign  appearing at last check almost one year ago.     3. Actinic damage/rosacea    Sent refill for tretinoin 0.025% per patient request, originally prescribed 2/15/12 for actinic damage/rosacea.  4. Solar lentigines    Benign nature was discussed. No further intervention required at this time.     5. Seborrheic keratosis, non irritated    Reassured of benign nature, no further treatment needed.    6. Cherry angioma(s)    Reassured of benign nature, no further treatment needed.    Follow-up in 1 year, earlier for new or changing lesions.     Sarah Mortimer, MS4, acting as scribe for Dr. Quinn.  .The medical student acted as scribe for this encounter.  The encounter documented above was completely performed by myself.  Anna Quinn MD

## 2017-10-09 NOTE — MR AVS SNAPSHOT
After Visit Summary   10/9/2017    Jesus Anglin    MRN: [id number]           Patient Information     Date Of Birth          1965        Visit Information        Provider Department      10/9/2017 8:00 AM Anna Quinn MD [facility] Dermatology        Today's Diagnoses     Actinic skin damage           Follow-ups after your visit        Follow-up notes from your care team     Return in about 1 year (around 10/9/2018).      Who to contact     Please call your clinic at [phone] to:    Ask questions about your health    Make or cancel appointments    Discuss your medicines    Learn about your test results    Speak to your doctor   If you have compliments or concerns about an experience at your clinic, or if you wish to file a complaint, please contact [facility] Patient Relations at [phone] or email us at [email or web address]         Additional Information About Your Visit        MyChart Information     [employer] gives you secure access to your electronic health record. If you see a primary care provider, you can also send messages to your care team and make appointments. If you have questions, please call your primary care clinic.  If you do not have a primary care provider, please call [phone] and they will assist you.      [employer] is an electronic gateway that provides easy, online access to your medical records. With [employer], you can request a clinic appointment, read your test results, renew a prescription or communicate with your care team.     To access your existing account, please contact your [facility] or call [phone] for assistance.        Care EveryWhere ID     This is your Care EveryWhere ID. This could be used by other organizations to access your [facility] medical records  [id number]         Blood Pressure from Last 3 Encounters:   08/18/17 116/71   05/30/17 110/71   03/31/17 112/72     Weight from Last 3 Encounters:   08/18/17 61.7 kg (136 lb 1.3 oz)   05/30/17 60.8 kg (134 lb)   03/31/17 61.7 kg (136 lb)              Today, you had the following     No orders found for display         Where to get your medicines      These medications were sent to [facility] [id number] [facility] - [place], [place] - [facility] [facility]  [address], [facility] [address]     Phone:  [phone]     tretinoin 0.025 % cream          Primary Care Provider Office Phone # Fax #    Faye Beck [phone]       [address] [facility] [address]  [facility] [address]        Equal Access to Services     CHIRAG AMADOR : Sonia Maynard, edison bryan, vidhi cuevasmaanita rueda, kapil temple. So [facility] [phone].    ATENCIÓN: Si habla español, tiene a albarado disposición servicios gratuitos de asistencia lingüística. Llame al [phone].    We comply with applicable federal civil rights laws and [place] laws. We do not discriminate on the basis of race, color, national origin, age, disability, sex, sexual orientation, or gender identity.            Thank you!     Thank you for choosing [facility]  for your care. Our goal is always to provide you with excellent care. Hearing back from our patients is one way we can continue to improve our services. Please take a few minutes to complete the written survey that you may receive in the mail after your visit with us. Thank you!             Your Updated Medication List - Protect others around you: Learn how to safely use, store and throw away your medicines at www.disposemymeds.org.          This list is accurate as of: 10/9/17  1:38 PM.  Always use your most recent med list.                   Brand Name Dispense Instructions for use Diagnosis    azithromycin 500 MG tablet    ZITHROMAX    3 tablet    Take 1 tablet (500 mg) by mouth daily Use for 1-3 days to treat severe diarrhea associated with travel    Travel advice  encounter       fish oil-omega-3 fatty acids 1000 MG capsule      Take 1 capsule by mouth daily.        MIRENA (52 MG) 20 MCG/24HR IUD   Generic drug:  levonorgestrel      Use as directed        multivitamin per tablet      Take 1 tablet by mouth daily.        nystatin ointment    MYCOSTATIN    30 g    Apply topically 2 times daily    Dysuria       tretinoin 0.025 % cream    RETIN-A    135 g    daily    Actinic skin damage       VITAMIN D (CHOLECALCIFEROL) PO      Take by mouth daily

## 2017-10-09 NOTE — LETTER
10/9/2017       RE: Jesus Anglin  [address] [facility] [address]     Dear Colleague,    Thank you for referring your patient, Jesus Anglin, to the [facility] DERMATOLOGY at [facility]. Please see a copy of my visit note below.    [facility] Dermatology Note    Dermatology Problem List:  1. Lentigo Maligna left nasal ala 2010, Mohs excision 2010 with reconstruction by plastics and surface treatments by Dr. Martinez in the year after surgery.  2. Choroidal nevi of the right eye, two: both benign, patient believes she was seen within the last year but is unsure of the date  3. Rosacea, tx with adapalene  4. Seborrheic Dermatitis  5. Dysplastic Nevus of the L lower buttock  6. Cherry angiomas    Encounter Date: Oct 9, 2017    CC:   Chief Complaint   Patient presents with     Derm Problem     Jesus is here today for a skin check.  States she has no areas of concern today.           History of Present Illness:  Ms. Jesus Anglin is a 52 year old female who with a history of skin cancer presents for annual examination.  She is doing well today with no new concerns. She has been using an spf 15 lotion on her face daily and an spf 50 foundation. She overall avoids the sun. She uses head and shoulders shampoo on occasion for dry, flaky scalp and that resolves it. She has been using tretinoin 0.025% cream every couple of days.  No other skin concerns today.    Past Medical History:   Patient Active Problem List   Diagnosis     Lentigo maligna (H)     Benign neoplasm of choroid     Postmenopausal status     Past Medical History:   Diagnosis Date     Lentigo maligna (H)      Past Surgical History:   Procedure Laterality Date      SECTION       MOHS MICROGRAPHIC PROCEDURE       REPAIR MOHS         Social History:  The patient works in [employer].    Family History:  There is a family history of non-melanoma skin cancer in the  patient's mother.    Medications:  Current Outpatient Prescriptions   Medication Sig Dispense Refill     tretinoin (RETIN-A) 0.025 % cream daily 135 g 12     VITAMIN D, CHOLECALCIFEROL, PO Take by mouth daily       fish oil-omega-3 fatty acids (FISH OIL) 1000 MG capsule Take 1 capsule by mouth daily.       Multiple Vitamin (MULTIVITAMIN) per tablet Take 1 tablet by mouth daily.       azithromycin (ZITHROMAX) 500 MG tablet Take 1 tablet (500 mg) by mouth daily Use for 1-3 days to treat severe diarrhea associated with travel (Patient not taking: Reported on 10/9/2017) 3 tablet 0     nystatin (MYCOSTATIN) ointment Apply topically 2 times daily (Patient not taking: Reported on 10/9/2017) 30 g 1     levonorgestrel (MIRENA) 20 MCG/24HR IUD Use as directed          Allergies   Allergen Reactions     Penicillin G Hives     Benzoyl Peroxide Swelling and Rash         Review of Systems:  -Constitutional: Once weekly night sweats.  Notes that she is in early menopause per her gyn.  The patient denies fatigue, fevers, chills, unintended weight loss.  -Skin: As above in HPI. No additional skin concerns.    Physical exam:  Vitals: There were no vitals taken for this visit.  GEN: This is a well developed, well-nourished female in no acute distress, in a pleasant mood.    SKIN: Total skin excluding the undergarment areas was performed. The exam included the head/face, neck, both arms, chest, back, abdomen, both legs, digits and/or nails. Buttocks also examined.  -Well healed scar on L nasal tip/ala.  Without pigment recurrence.  -Few light brown, symmetric macules on the face and sun exposed areas consistent with solar lentigines.  -Brown, waxy, stuck on papules on the back consistent with SKs.  -Many pink domed shaped papules on the trunk consistent with cherry angiomas.  -Toenails painted.  -No other lesions of concern on areas examined.   LYMPH NODES:  No submandibular, cervical, supraclavicular, axillary, or inguinal lymph  adenopathy.    Impression/Plan:  1. History of lentigo maligna, L nose, Moh's excision 2010.    No evidence of recurrence.    Recommended continuation of yearly skin checks.    Recommended continued sun avoidance and sun screen use    2. Chorodial nevi    Following with ophthalmology yearly. Benign appearing at last check almost one year ago.     3. Actinic damage/rosacea    Sent refill for tretinoin 0.025% per patient request, originally prescribed 2/15/12 for actinic damage/rosacea.  4. Solar lentigines    Benign nature was discussed. No further intervention required at this time.     5. Seborrheic keratosis, non irritated    Reassured of benign nature, no further treatment needed.    6. Cherry angioma(s)    Reassured of benign nature, no further treatment needed.    Follow-up in 1 year, earlier for new or changing lesions.     Sarah Mortimer, MS4, acting as scribe for Dr. Quinn.  .The medical student acted as scribe for this encounter.  The encounter documented above was completely performed by myself.  Anna Quinn MD

## 2017-10-09 NOTE — NURSING NOTE
Dermatology Rooming Note    Jesus M Linnette's goals for this visit include:   Chief Complaint   Patient presents with     Derm Problem     Jesus is here today for a skin check.  States she has no areas of concern today.           Harini Cooley LPN

## 2017-10-10 ENCOUNTER — TELEPHONE (OUTPATIENT)
Dept: DERMATOLOGY | Facility: CLINIC | Age: 52
End: 2017-10-10

## 2017-10-10 NOTE — TELEPHONE ENCOUNTER
[facility] Prior Authorization Team   Phone: [phone]  Fax: [phone]      PA Initiation    Medication: tretinoin (RETIN-A) 0.025 % cream  Insurance Company: [employer] - Phone [phone] Fax [phone]  Pharmacy Filling the Rx: [id number] [place], [place] - [address] [place] [address] W  Filling Pharmacy Phone: [phone]  Filling Pharmacy Fax: [phone]  Start Date: 10/10/2017

## 2017-10-11 NOTE — TELEPHONE ENCOUNTER
Called [facility] at [phone] to verify insurance information as [employer] faxed back that they are not a member. Spoke to pharmacy tech and obtained patient's [employer] information.   Resubmitted prior authorization via CMM:

## 2017-10-13 NOTE — TELEPHONE ENCOUNTER
[facility] Prior Authorization Team   Phone: [phone]  Fax: [phone]      PRIOR AUTHORIZATION DENIED    Medication: tretinoin (RETIN-A) 0.025 % cream- Denied    Denial Date: 10/13/2017    Denial Rational: PA is denied due to patient not having an FDA approved indication for this medication:        Appeal Information:

## 2017-10-17 ENCOUNTER — MYC MEDICAL ADVICE (OUTPATIENT)
Dept: OTHER | Age: 52
End: 2017-10-17

## 2018-05-01 DIAGNOSIS — R30.0 DYSURIA: ICD-10-CM

## 2018-05-01 RX ORDER — NYSTATIN 100000 U/G
OINTMENT TOPICAL
Qty: 30 G | Refills: 1 | Status: SHIPPED | OUTPATIENT
Start: 2018-05-01 | End: 2019-05-15

## 2018-05-01 NOTE — TELEPHONE ENCOUNTER
Last visit 8/18/17, next appt Sept 2018    Prescription approved per [facility] [facility].

## 2018-06-15 ENCOUNTER — OFFICE VISIT (OUTPATIENT)
Dept: DERMATOLOGY | Facility: CLINIC | Age: 53
End: 2018-06-15
Payer: COMMERCIAL

## 2018-06-15 DIAGNOSIS — L82.0 SEBORRHEIC KERATOSES, INFLAMED: ICD-10-CM

## 2018-06-15 DIAGNOSIS — Z12.83 SKIN CANCER SCREENING: ICD-10-CM

## 2018-06-15 DIAGNOSIS — Z86.006 HISTORY OF MELANOMA IN SITU: Primary | ICD-10-CM

## 2018-06-15 ASSESSMENT — PAIN SCALES - GENERAL: PAINLEVEL: NO PAIN (0)

## 2018-06-15 NOTE — LETTER
6/15/2018     RE: Jesus Anglin  [address] [address]     Dear Colleague,    Thank you for referring your patient, Jesus Anglin, to the [facility] DERMATOLOGY at [facility]. Please see a copy of my visit note below.    [facility] Dermatology Note      Dermatology Problem List:  1. Lentigo Maligna left nasal ala 2010, Mohs excision 2010 with reconstruction by plastics and surface treatments by Dr. Martinez in the year after surgery.  2. Choroidal nevi of the right eye, two: both benign, patient believes she was seen within the last year but is unsure of the date  3. Rosacea, tx with adapalene  4. Seborrheic Dermatitis  5. Dysplastic Nevus of the L lower buttock  6. Cherry angiomas    CC:   Chief Complaint   Patient presents with     Skin Check     Jesus is here today for a skin check- has some moles of concern.         Encounter Date: Jah 15, 2018    History of Present Illness:  Ms. Jesus Anglin is a 53 year old female who returns for a FBSE. 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 Her last FBSE was in 2017 with Dr. Quinn. She wears sunscreen regularly.  She has a few new spots on her skin - one on her face and on her left waist. She says the spot on her cheek bleeds, but she picks at it. She has not noticed any swelling in the neck or lymph node swelling. The patient denies painful, itching, tingling or bleeding lesions unless otherwise noted. She is otherwise doing well.      Past Medical History:   Patient Active Problem List   Diagnosis     Lentigo maligna (H)     Benign neoplasm of choroid     Postmenopausal status     Past Medical History:   Diagnosis Date     Lentigo maligna (H)      Past Surgical History:   Procedure Laterality Date      SECTION       MOHS MICROGRAPHIC PROCEDURE        REPAIR MOHS         Social History:  The patient works in .    Family History:  There is a family history of non-melanoma skin cancer in the patient's mother. Father has also had NMSCs.     Medications:  Current Outpatient Prescriptions   Medication Sig Dispense Refill     fish oil-omega-3 fatty acids (FISH OIL) 1000 MG capsule Take 1 capsule by mouth daily.       Multiple Vitamin (MULTIVITAMIN) per tablet Take 1 tablet by mouth daily.       nystatin (MYCOSTATIN) ointment APPLY TOPICALLY TO AFFECTED AREA 2 TIMES DAILY 30 g 1     tretinoin (RETIN-A) 0.025 % cream daily 135 g 12     VITAMIN D, CHOLECALCIFEROL, PO Take by mouth daily       azithromycin (ZITHROMAX) 500 MG tablet Take 1 tablet (500 mg) by mouth daily Use for 1-3 days to treat severe diarrhea associated with travel (Patient not taking: Reported on 6/15/2018) 3 tablet 0     levonorgestrel (MIRENA) 20 MCG/24HR IUD Use as directed       Allergies   Allergen Reactions     Penicillin G Hives     Benzoyl Peroxide Swelling and Rash         Review of Systems:  -Constitutional: The patient denies fatigue, fevers, chills, unintended weight loss, and night sweats.  -Skin: As above in HPI. No additional skin concerns.    Physical exam:  Vitals: There were no vitals taken for this visit.  GEN: This is a well developed, well-nourished female in no acute distress, in a pleasant mood.    SKIN: Total skin excluding the undergarment areas was performed. The exam included the head/face, neck, both arms, chest, back, abdomen, both legs, digits and/or nails.   LYMPH: no LAD in the cervical and clavicular and posterior occipital nodes  -There is no erythema, telangectasias, nodularity, or pigmentation on the nose.  -There is a waxy stuck on tan to brown papule on the left preauricular area which is acutely inflamed.  -Nance's skin type II, fewer than 100 nevi  -No other lesions of concern on areas examined.     Impression/Plan:  1. Seborrheic  keratosis, inflammed    Cryotherapy procedure note: After verbal consent and discussion of risks and benefits including but no limited to dyspigmentation/scar, blister, and pain, 1 was(were) treated with 1-2mm freeze border for 2 cycles with liquid nitrogen. Post cryotherapy instructions were provided.     2. History of melanoma in situ (lentigo maligna), no clinical evidence of recurrence    ABCDs of melanoma were discussed and self skin checks were advised.     Sunscreen: Apply 20 minutes prior to going outdoors and reapply every two hours, when wet or sweating. We recommend using an SPF 30 or higher, and to use one that is water resistant.      Continue with regular eye exams - patient is scheduled next week.      3. Skin Cancer Screening    ABCD's of melanoma were reviewed with patient and handout provided.     Sunscreen: Apply 20 minutes prior to going outdoors and reapply every two hours, when wet or sweating. We recommend using an SPF 30 or higher, and to use one that is water resistant.       CC Dr. Quinn on close of this encounter.  Follow-up in 6 months, earlier for new or changing lesions.       Staff Involved:  Staff Only  All risks, benefits and alternatives were discussed with patient.  Patient is in agreement and understands the assessment and plan.  All questions were answered.    Debo Sparks PA-C  [facility]: Phone: [phone], Fax: [phone]

## 2018-06-15 NOTE — NURSING NOTE
Dermatology Rooming Note    Jseus Anglin's goals for this visit include:   Chief Complaint   Patient presents with     Skin Check     Jesus is here today for a skin check- has some moles of concern.     Louise Gordillo MA

## 2018-06-15 NOTE — PROGRESS NOTES
[facility] Dermatology Note      Dermatology Problem List:  1. Lentigo Maligna left nasal ala 2010, Mohs excision 2010 with reconstruction by plastics and surface treatments by Dr. Martinez in the year after surgery.  2. Choroidal nevi of the right eye, two: both benign, patient believes she was seen within the last year but is unsure of the date  3. Rosacea, tx with adapalene  4. Seborrheic Dermatitis  5. Dysplastic Nevus of the L lower buttock  6. Cherry angiomas    CC:   Chief Complaint   Patient presents with     Skin Check     Jesus is here today for a skin check- has some moles of concern.         Encounter Date: Jah 15, 2018    History of Present Illness:  Ms. Jesus Anglin is a 53 year old female who returns for a FBSE. 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 Her last FBSE was in 2017 with Dr. Quinn. She wears sunscreen regularly.  She has a few new spots on her skin - one on her face and on her left waist. She says the spot on her cheek bleeds, but she picks at it. She has not noticed any swelling in the neck or lymph node swelling. The patient denies painful, itching, tingling or bleeding lesions unless otherwise noted. She is otherwise doing well.      Past Medical History:   Patient Active Problem List   Diagnosis     Lentigo maligna (H)     Benign neoplasm of choroid     Postmenopausal status     Past Medical History:   Diagnosis Date     Lentigo maligna (H)      Past Surgical History:   Procedure Laterality Date      SECTION       MOHS MICROGRAPHIC PROCEDURE       REPAIR MOHS         Social History:  The patient works in product development.    Family History:  There is a family history of non-melanoma skin cancer in the patient's mother. Father has also had NMSCs.     Medications:  Current Outpatient Prescriptions   Medication Sig Dispense Refill      fish oil-omega-3 fatty acids (FISH OIL) 1000 MG capsule Take 1 capsule by mouth daily.       Multiple Vitamin (MULTIVITAMIN) per tablet Take 1 tablet by mouth daily.       nystatin (MYCOSTATIN) ointment APPLY TOPICALLY TO AFFECTED AREA 2 TIMES DAILY 30 g 1     tretinoin (RETIN-A) 0.025 % cream daily 135 g 12     VITAMIN D, CHOLECALCIFEROL, PO Take by mouth daily       azithromycin (ZITHROMAX) 500 MG tablet Take 1 tablet (500 mg) by mouth daily Use for 1-3 days to treat severe diarrhea associated with travel (Patient not taking: Reported on 6/15/2018) 3 tablet 0     levonorgestrel (MIRENA) 20 MCG/24HR IUD Use as directed       Allergies   Allergen Reactions     Penicillin G Hives     Benzoyl Peroxide Swelling and Rash         Review of Systems:  -Constitutional: The patient denies fatigue, fevers, chills, unintended weight loss, and night sweats.  -Skin: As above in HPI. No additional skin concerns.    Physical exam:  Vitals: There were no vitals taken for this visit.  GEN: This is a well developed, well-nourished female in no acute distress, in a pleasant mood.    SKIN: Total skin excluding the undergarment areas was performed. The exam included the head/face, neck, both arms, chest, back, abdomen, both legs, digits and/or nails.   LYMPH: no LAD in the cervical and clavicular and posterior occipital nodes  -There is no erythema, telangectasias, nodularity, or pigmentation on the nose.  -There is a waxy stuck on tan to brown papule on the left preauricular area which is acutely inflamed.  -Nance's skin type II, fewer than 100 nevi  -No other lesions of concern on areas examined.     Impression/Plan:  1. Seborrheic keratosis, inflammed    Cryotherapy procedure note: After verbal consent and discussion of risks and benefits including but no limited to dyspigmentation/scar, blister, and pain, 1 was(were) treated with 1-2mm freeze border for 2 cycles with liquid nitrogen. Post cryotherapy instructions were  provided.     2. History of melanoma in situ (lentigo maligna), no clinical evidence of recurrence    ABCDs of melanoma were discussed and self skin checks were advised.     Sunscreen: Apply 20 minutes prior to going outdoors and reapply every two hours, when wet or sweating. We recommend using an SPF 30 or higher, and to use one that is water resistant.      Continue with regular eye exams - patient is scheduled next week.      3. Skin Cancer Screening    ABCD's of melanoma were reviewed with patient and handout provided.     Sunscreen: Apply 20 minutes prior to going outdoors and reapply every two hours, when wet or sweating. We recommend using an SPF 30 or higher, and to use one that is water resistant.       CC Dr. Quinn on close of this encounter.  Follow-up in 6 months, earlier for new or changing lesions.       Staff Involved:  Staff Only  All risks, benefits and alternatives were discussed with patient.  Patient is in agreement and understands the assessment and plan.  All questions were answered.    Debo Sparks PA-C  [facility]: Phone: [phone], Fax: [phone]

## 2018-06-18 ENCOUNTER — MEDICAL CORRESPONDENCE (OUTPATIENT)
Dept: HEALTH INFORMATION MANAGEMENT | Facility: CLINIC | Age: 53
End: 2018-06-18

## 2018-06-18 ENCOUNTER — TRANSFERRED RECORDS (OUTPATIENT)
Dept: HEALTH INFORMATION MANAGEMENT | Facility: CLINIC | Age: 53
End: 2018-06-18

## 2018-06-21 ENCOUNTER — TELEPHONE (OUTPATIENT)
Dept: OPHTHALMOLOGY | Facility: CLINIC | Age: 53
End: 2018-06-21

## 2018-06-21 NOTE — TELEPHONE ENCOUNTER
[facility] Call Center    Phone Message    May a detailed message be left on voicemail: yes    Reason for Call: Other: Patient is being referred by Dr. Russell Naylor with [facility] to be seen in our Neuro Ophthalmology department for a choroidal nevus, right eye. Encounter sent to clinic for scheduling. Will fax referral and records to clinic.     Action Taken: Message routed to:  [facility] ([facility] Clinic

## 2018-06-22 NOTE — TELEPHONE ENCOUNTER
Left message at [phone] to review referral and assist in scheduling  Edwin Mg RN 1:30 PM 06/22/18

## 2018-06-22 NOTE — TELEPHONE ENCOUNTER
Spoke to pt [phone]  Scheduled with dr. ruiz July 23rd  H/o choroidal nevus   Seen By Dr. Villasenor here in 2010    Second spot on retina now per pt-- referring to dr. ruiz    Pt satisfied with date/time/location    Edwin Mg RN 2:07 PM 06/22/18

## 2018-07-05 DIAGNOSIS — H31.8 CHOROIDAL LESION: Primary | ICD-10-CM

## 2018-07-09 ENCOUNTER — OFFICE VISIT (OUTPATIENT)
Dept: OPHTHALMOLOGY | Facility: CLINIC | Age: 53
End: 2018-07-09
Attending: OPHTHALMOLOGY
Payer: COMMERCIAL

## 2018-07-09 DIAGNOSIS — D31.31 CHOROIDAL NEVUS OF RIGHT EYE: Primary | ICD-10-CM

## 2018-07-09 DIAGNOSIS — H31.8 CHOROIDAL LESION: ICD-10-CM

## 2018-07-09 PROCEDURE — G0463 HOSPITAL OUTPT CLINIC VISIT: HCPCS | Mod: ZF | Performed by: TECHNICIAN/TECHNOLOGIST

## 2018-07-09 PROCEDURE — 92250 FUNDUS PHOTOGRAPHY W/I&R: CPT | Mod: ZF | Performed by: OPHTHALMOLOGY

## 2018-07-09 PROCEDURE — 76510 OPH US DX B-SCAN&QUAN A-SCAN: CPT | Mod: ZF | Performed by: OPHTHALMOLOGY

## 2018-07-09 PROCEDURE — 92134 CPTRZ OPH DX IMG PST SGM RTA: CPT | Mod: ZF | Performed by: OPHTHALMOLOGY

## 2018-07-09 ASSESSMENT — SLIT LAMP EXAM - LIDS
COMMENTS: NORMAL
COMMENTS: NORMAL

## 2018-07-09 ASSESSMENT — CONF VISUAL FIELD
METHOD: COUNTING FINGERS
OD_NORMAL: 1
OS_NORMAL: 1

## 2018-07-09 ASSESSMENT — VISUAL ACUITY
OS_SC: 20/25
METHOD: SNELLEN - LINEAR
OD_SC: 20/20

## 2018-07-09 ASSESSMENT — EXTERNAL EXAM - LEFT EYE: OS_EXAM: NORMAL

## 2018-07-09 ASSESSMENT — TONOMETRY
OS_IOP_MMHG: 17
OD_IOP_MMHG: 14
IOP_METHOD: ICARE

## 2018-07-09 ASSESSMENT — CUP TO DISC RATIO
OS_RATIO: 0.3
OD_RATIO: 0.3

## 2018-07-09 ASSESSMENT — EXTERNAL EXAM - RIGHT EYE: OD_EXAM: NORMAL

## 2018-07-09 NOTE — MR AVS SNAPSHOT
After Visit Summary   7/9/2018    Jesus Anglin    MRN: [id number]           Patient Information     Date Of Birth          1965        Visit Information        Provider Department      7/9/2018 8:00 AM Leana Reed MD Eye Clinic        Today's Diagnoses     Choroidal nevus of right eye    -  1    Choroidal lesion           Follow-ups after your visit        Your next 10 appointments already scheduled     Jul 23, 2018  2:30 PM CDT   NEW RETINA with Leana Reed MD   Eye Clinic ([facility])    [facility]  [address] [place] [address]  9th [address] Clin [address]  [facility] [address]   [phone]            Sep 07, 2018  3:40 PM CDT   PHYSICAL with Faye Beck MD   [facility] ([facility])    [facility]  [address] [place] [address], [address]  [employer] [address]   [phone]              Who to contact     Please call your clinic at [phone] to:    Ask questions about your health    Make or cancel appointments    Discuss your medicines    Learn about your test results    Speak to your doctor            Additional Information About Your Visit        Mind [employer] Information     [employer] gives you secure access to your electronic health record. If you see a primary care provider, you can also send messages to your care team and make appointments. If you have questions, please call your primary care clinic.  If you do not have a primary care provider, please call [phone] and they will assist you.      [employer] is an electronic gateway that provides easy, online access to your medical records. With [employer], you can request a clinic appointment, read your test results, renew a prescription or communicate with your care team.     To access your existing account, please contact your [facility] or call [phone] for assistance.        Care EveryWhere ID     This is your Care EveryWhere ID. This could be used  by other organizations to access your [facility] medical records  [id number]         Blood Pressure from Last 3 Encounters:   08/18/17 116/71   05/30/17 110/71   03/31/17 112/72    Weight from Last 3 Encounters:   08/18/17 61.7 kg (136 lb 1.3 oz)   05/30/17 60.8 kg (134 lb)   03/31/17 61.7 kg (136 lb)              We Performed the Following     Fundus Photos OU (both eyes)     OCT Retina Spectralis OU (both eyes)     US B-scan and A-scan OD (right eye)          Today's Medication Changes          These changes are accurate as of 7/9/18 10:11 AM.  If you have any questions, ask your nurse or doctor.               Stop taking these medicines if you haven't already. Please contact your care team if you have questions.     azithromycin 500 MG tablet   Commonly known as:  ZITHROMAX   Stopped by:  Leana Reed MD           MIRENA (52 MG) 20 MCG/24HR IUD   Generic drug:  levonorgestrel   Stopped by:  Leana Reed MD                    Primary Care Provider Office Phone # Fax #    Faye Beck [phone]       [address] [facility] [address]  [facility] [address]        Equal Access to Services     GREG AMADOR AH: Hadii cesar garciao Sotyrellali, waaxda luqadaha, qaybta kaalmada adeegyada, kapil temple. So [facility] [phone].    ATENCIÓN: Si habla español, tiene a albarado disposición servicios gratMimbres Memorial Hospitalos de asistencia lingüística. Llliane al [phone].    We comply with applicable federal civil rights laws and [place] laws. We do not discriminate on the basis of race, color, national origin, age, disability, sex, sexual orientation, or gender identity.            Thank you!     Thank you for choosing EYE CLINIC  for your care. Our goal is always to provide you with excellent care. Hearing back from our patients is one way we can continue to improve our services. Please take a few minutes to complete the written survey that you may receive in the mail after your  visit with us. Thank you!             Your Updated Medication List - Protect others around you: Learn how to safely use, store and throw away your medicines at www.disposemymeds.org.          This list is accurate as of 7/9/18 10:11 AM.  Always use your most recent med list.                   Brand Name Dispense Instructions for use Diagnosis    fish oil-omega-3 fatty acids 1000 MG capsule      Take 1 capsule by mouth daily.        multivitamin per tablet      Take 1 tablet by mouth daily.        nystatin ointment    MYCOSTATIN    30 g    APPLY TOPICALLY TO AFFECTED AREA 2 TIMES DAILY    Dysuria       tretinoin 0.025 % cream    RETIN-A    135 g    daily    Actinic skin damage       VITAMIN D (CHOLECALCIFEROL) PO      Take by mouth daily

## 2018-07-09 NOTE — PROGRESS NOTES
CC -   Choroidal nevus OD    INTERVAL HISTORY - Initial visit with me. No vision changes    HPI -   Jesus Anglin is a  53 year old year-old patient referred by Dr Naylor from [facility] for evaluation and treat of choroidal nevus OD.   Had seen optometrist for routine exam, concern regarding nevus OD, referred to [facility], found that nevus had changed compared to prior  records.  H/o lentigo maligna on nose, no other CA history         PAST OCULAR SURGERY  LASIK OU 2001    RETINAL IMAGING:  OCT 7-9-18  OD -  Macula- dome shape, mild drusen, PHF attached   Lesion- peripapillary choroidal infiltrative lesion vs nevus  OS -  Macula - dome shape, mild drusen, PHF attached    PHOTOS 7-9-18  OD- peripapillary lesion, more prominent with more pigment than in 2012, larger borders than in 2009  OS- IT pigmented scar    U/S OD  7-9-18  A-scan - unreliable/unable  B-scan - flat lesion        ASSESSMENT & PLAN    1.  Choroidal nevus OD   - doubt other infiltrative process, suspect nevus   - small, minimal thickness, no orange/fluid   - peripapillary   - increased prominence and pigment change from 2012 ?mild increased borers   - size increased by ~ 1 mm radius from 2009 photos   - given peripapillary and 240 degrees around ONH unable to plaque if needs Tx   - changes are concerning, unclear if sufficient to warrant Tx   - will refer for opinion from [facility] or [facility]       2.  Mild drusen/pigment change OU    3.  NS OU   - very mild           return to clinic: 1-2 months with referral to [facility]    ATTESTATION     Attending Attestation:     Complete documentation of historical and exam elements from today's encounter can be found in the full encounter summary report (not reduplicated in this progress note).  I personally obtained the chief complaint(s) and history of present illness.  I confirmed and edited as necessary the review of systems, past medical/surgical history, family history, social history, and examination  findings as documented by others; and I examined the patient myself.  I personally reviewed the relevant tests, images, and reports as documented above.  I formulated and edited as necessary the assessment and plan and discussed the findings and management plan with the patient and family    Leana Reed MD, PhD  , Vitreoretinal Surgery  Department of Ophthalmology  [facility]

## 2018-07-09 NOTE — NURSING NOTE
Chief Complaints and History of Present Illnesses   Patient presents with     New Patient     referral for retinal evaluation for choroidal lesion     HPI    Symptoms:              Comments:  Per referral notes:   1. Choroidal nevus, LEFT eye, clear growth from 2007 photo from previous photo done 6/18/18; peripapillary, orange pigment and ? satellite lesion inferiorly.   H/o LASIK 2001.     Patient states vision in well in both eyes. Denies flashes or floaters.   Patient states dry eyes left>right. Not currently wearing any glasses.     BRYANNA Downey 7/9/2018 8:43 AM

## 2018-07-09 NOTE — LETTER
7/9/2018       RE: Jesus Anglin  [address] [facility] [address]     Dear Colleague,    Thank you for referring your patient, Jesus Anglin, to the EYE CLINIC at [facility]. Please see a copy of my visit note below.    CC -   Choroidal nevus OD    INTERVAL HISTORY - Initial visit with me. No vision changes    HPI -   Jesus Anglin is a  53 year old year-old patient referred by Dr Naylor from [facility] for evaluation and treat of choroidal nevus OD.   Had seen optometrist for routine exam, concern regarding nevus OD, referred to [facility], found that nevus had changed compared to prior  records.  H/o lentigo maligna on nose, no other CA history         PAST OCULAR SURGERY  LASIK OU 2001    RETINAL IMAGING:  OCT 7-9-18  OD -  Macula- dome shape, mild drusen, PHF attached   Lesion- peripapillary choroidal infiltrative lesion vs nevus  OS -  Macula - dome shape, mild drusen, PHF attached    PHOTOS 7-9-18  OD- peripapillary lesion, more prominent with more pigment than in 2012, larger borders than in 2009  OS- IT pigmented scar    U/S OD  7-9-18  A-scan - unreliable/unable  B-scan - flat lesion        ASSESSMENT & PLAN    1.  Choroidal nevus OD   - doubt other infiltrative process, suspect nevus   - small, minimal thickness, no orange/fluid   - peripapillary   - increased prominence and pigment change from 2012 ?mild increased borers   - size increased by ~ 1 mm radius from 2009 photos   - given peripapillary and 240 degrees around ONH unable to plaque if needs Tx   - changes are concerning, unclear if sufficient to warrant Tx   - will refer for opinion from [facility] or [facility]       2.  Mild drusen/pigment change OU    3.  NS OU   - very mild           return to clinic: 1-2 months with referral to [facility]    ATTESTATION     Attending Attestation:     Complete documentation of historical and exam elements from today's encounter can be found in the full encounter summary  report (not reduplicated in this progress note).  I personally obtained the chief complaint(s) and history of present illness.  I confirmed and edited as necessary the review of systems, past medical/surgical history, family history, social history, and examination findings as documented by others; and I examined the patient myself.  I personally reviewed the relevant tests, images, and reports as documented above.  I formulated and edited as necessary the assessment and plan and discussed the findings and management plan with the patient and family    Leana Reed MD, PhD  , Vitreoretinal Surgery  Department of Ophthalmology  [facility]          Base Eye Exam     Visual Acuity (Snellen - Linear)      Right Left   Dist sc 20/20 20/25       Defers MRx, h/o lasik in 2001 per patient      Tonometry ([facility], 8:39 AM)      Right Left   Pressure 14 17         Pupils      React APD   Right Brisk None   Left Brisk None         Visual Fields (Counting fingers)      Left Right   Result Full Full         Extraocular Movement      Right Left   Result Full, Ortho Full, Ortho         Neuro/Psych     Oriented x3:  Yes    Mood/Affect:  Normal      Dilation     Both eyes:  1.0% Mydriacyl, 2.5% Christ Synephrine @ 8:41 AM            Slit Lamp and Fundus Exam     External Exam      Right Left    External Normal Normal      Slit Lamp Exam      Right Left    Lids/Lashes Normal Normal    Conjunctiva/Sclera White and quiet White and quiet    Cornea Clear Clear    Anterior Chamber Deep and quiet Deep and quiet    Iris Dilated Dilated    Lens tr NS tr NS    Vitreous syneresis syneresis      Fundus Exam      Right Left    Disc disk normal, peripapillary nevus variable pigmented/non-pigmented,  minimal elevation, no fluid, ~2 DD max width, 240 degrees  disk normal, peripapillary nevus variable pigmented/non-pigmented,  minimal elevation, no fluid, ~2 DD max width, 240 degrees     C/D Ratio 0.3 0.3    Macula  mild small drusen/pig change mild small drusen/pig change    Vessels Normal Normal    Periphery inferior CRA pigmented CR scar IT                Again, thank you for allowing me to participate in the care of your patient.      Sincerely,    Leana Reed MD, PhD  , Vitreoretinal Surgery  Department of Ophthalmology & Visual Neurosciences  [facility]

## 2018-08-27 ENCOUNTER — RADIANT APPOINTMENT (OUTPATIENT)
Dept: MAMMOGRAPHY | Facility: CLINIC | Age: 53
End: 2018-08-27
Attending: FAMILY MEDICINE
Payer: COMMERCIAL

## 2018-08-27 DIAGNOSIS — Z12.39 SCREENING BREAST EXAMINATION: ICD-10-CM

## 2018-08-28 ENCOUNTER — RADIANT APPOINTMENT (OUTPATIENT)
Dept: MAMMOGRAPHY | Facility: CLINIC | Age: 53
End: 2018-08-28
Attending: FAMILY MEDICINE
Payer: COMMERCIAL

## 2018-08-28 DIAGNOSIS — R92.8 ABNORMAL MAMMOGRAM OF LEFT BREAST: ICD-10-CM

## 2018-09-06 NOTE — PROGRESS NOTES
"SUBJECTIVE: 53 year old female who presents for annual exam:   HPI:    Menopause resolved no hot flashes or night sweats.     Happy the IUD is out     Due for pap/HPV    Mammogram negative 2018    Colonoscopy up todate    Seeing ophthalmology at the [facility] for a concerning lesion on her optic nerve.   Review Of Systems  Skin: none  Eyes: negative  Ears/Nose/Throat: negative  Respiratory: none  Cardiovascular: negative  Gastrointestinal: negative  Genitourinary: negative  Musculoskeletal: negative  Neurologic: negative  Psychiatric: negative  Hematologic/Lymphatic/Immunologic: negative  Endocrine: night sweats   PAST OB/GYN HISTORY:  .  Mirena since .  No abnormal bleeding.  vasectomy.  HCM: Mammogram; 2018; colonoscopy 2014;   Exercise: regular exercise.   Past Medical History:   Diagnosis Date     Lentigo maligna (H)      Past Surgical History:   Procedure Laterality Date      SECTION       MOHS MICROGRAPHIC PROCEDURE       REPAIR MOHS       Medication     clindamycin (CLEOCIN-T) 1 % external solution     tretinoin (RETIN-A) 0.025 % cream     fish oil-omega-3 fatty acids (FISH OIL) 1000 MG capsule     levonorgestrel (MIRENA) 20 MCG/24HR IUD     Multiple Vitamin (MULTIVITAMIN) per tablet   Social History:  Three sons - one just started at the [employer]. Two older sons and one just off to the [employer]. 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   Family History:   No Breast or ovarian disease.  Mother with hyperlipidemia  PHYSICAL EXAM:  /64 (BP Location: Right arm, Patient Position: Chair, Cuff Size: Adult Regular)  Pulse 61  Temp 97.8  F (36.6  C) (Oral)  Ht 5' 3.94\" (162.4 cm)  Wt 141 lb (64 kg)  LMP  (LMP Unknown)  SpO2 98%  BMI 24.25 kg/m2  Constitutional: Well appearing woman in no acute distress.  Psychological: appropriate mood.  Eyes: anicteric, normal extra-ocular movements  Neck supple with full range of motion.  " No thyroidmegaly.   Cardiovascular: regular rate and rhythm, normal S1 and S2, no murmurs, rubs or gallops, peripheral pulses full and symmetric   Respiratory: clear to auscultation, no wheezes or crackles, normal breath sounds.  Breast: Symmetrical without visible distortion or swelling. No masses noted. No nipple inversion, no breast dimpling or puckering. Axillary area without masses or lympadenapathy.   Gastrointestinal: positive bowel sounds, nontender, no hepatosplenomegaly, no masses. No guarding or rebound.  Genitourinary: External genitalia is normal appearance.  No enlargement of the Bartholin or Wickett glands. Urethra and bladder are non-tender. Vagina is without lesions or discharge. Normal epithelium, no anterior or posterior wall defects. Cervix is smooth, without lesions, no cervical motion tenderness.  Uterus is normal size, shape, and contour. PAP//HPV.  Adnexa without tenderness or enlarged. Rectal exam with normal sphincter tone, no masses. Perineum without lesions.   Lymphatic: no lymphadenopathy.  Musculoskeletal: full range of motion    Skin: no concerning lesions, no jaundice.  Neurological: normal gait, no tremor.   Diagnoses and associated orders for this visit:  Annual physical exam    - mammogram up to date    - colonoscopy up to date  Cervical cancer screening  -     Pap imaged thin layer screen with HPV - recommended age 30 - 65 years (select HPV order below) obtained today  Screening for diabetes mellitus  -     Basic Metabolic Panel ([facility])  Screening for cholesterol level  -     Lipid Panel ([facility])

## 2018-09-07 ENCOUNTER — OFFICE VISIT (OUTPATIENT)
Dept: FAMILY MEDICINE | Facility: CLINIC | Age: 53
End: 2018-09-07
Payer: COMMERCIAL

## 2018-09-07 VITALS
BODY MASS INDEX: 24.07 KG/M2 | HEART RATE: 61 BPM | SYSTOLIC BLOOD PRESSURE: 122 MMHG | HEIGHT: 64 IN | TEMPERATURE: 97.8 F | WEIGHT: 141 LBS | DIASTOLIC BLOOD PRESSURE: 64 MMHG | OXYGEN SATURATION: 98 %

## 2018-09-07 DIAGNOSIS — Z13.220 SCREENING FOR CHOLESTEROL LEVEL: ICD-10-CM

## 2018-09-07 DIAGNOSIS — Z12.4 CERVICAL CANCER SCREENING: ICD-10-CM

## 2018-09-07 DIAGNOSIS — Z13.1 SCREENING FOR DIABETES MELLITUS: ICD-10-CM

## 2018-09-07 DIAGNOSIS — Z00.00 ANNUAL PHYSICAL EXAM: Primary | ICD-10-CM

## 2018-09-07 ASSESSMENT — PAIN SCALES - GENERAL: PAINLEVEL: NO PAIN (0)

## 2018-09-07 NOTE — MR AVS SNAPSHOT
"              After Visit Summary   9/7/2018    Jesus Anglin    MRN: [id number]           Patient Information     Date Of Birth          1965        Visit Information        Provider Department      9/7/2018 3:40 PM Faye Beck MD [facility]        Today's Diagnoses     Annual physical exam    -  1    Cervical cancer screening        Screening for diabetes mellitus        Screening for cholesterol level           Follow-ups after your visit        Who to contact     Please call your clinic at [phone] to:    Ask questions about your health    Make or cancel appointments    Discuss your medicines    Learn about your test results    Speak to your doctor            Additional Information About Your Visit        MyChart Information     [employer] gives you secure access to your electronic health record. If you see a primary care provider, you can also send messages to your care team and make appointments. If you have questions, please call your primary care clinic.  If you do not have a primary care provider, please call [phone] and they will assist you.      [employer] is an electronic gateway that provides easy, online access to your medical records. With [employer], you can request a clinic appointment, read your test results, renew a prescription or communicate with your care team.     To access your existing account, please contact your [facility] or call [phone] for assistance.        Care EveryWhere ID     This is your Care EveryWhere ID. This could be used by other organizations to access your [place] medical records  [id number]        Your Vitals Were     Pulse Temperature Height Last Period Pulse Oximetry BMI (Body Mass Index)    61 97.8  F (36.6  C) (Oral) 5' 3.94\" (162.4 cm) (LMP Unknown) 98% 24.25 kg/m2       Blood Pressure from Last 3 Encounters:   09/07/18 122/64   08/18/17 116/71   05/30/17 110/71    Weight from Last 3 Encounters:   09/07/18 141 " lb (64 kg)   08/18/17 136 lb 1.3 oz (61.7 kg)   05/30/17 134 lb (60.8 kg)              We Performed the Following     Basic metabolic panel     Lipid Profile     Pap imaged thin layer screen with HPV - recommended age 30 - 65 years (select HPV order below)        Primary Care Provider Office Phone # Fax #    Faye Beck [phone]       [address] [facility] [address]  [facility] [address]        Equal Access to Services     CHIRAG AMADOR : Hadii aad ku hadasho Soomaali, waaxda luqadaha, qaybta kaalmada adeegyada, waxay idiin hayaan adeeg kharash lacasa . So [facility] [phone].    ATENCIÓN: Si habla español, tiene a albarado disposición servicios gratuitos de asistencia lingüística. [facility] [phone].    We comply with applicable federal civil rights laws and [place] laws. We do not discriminate on the basis of race, color, national origin, age, disability, sex, sexual orientation, or gender identity.            Thank you!     Thank you for choosing [facility]  for your care. Our goal is always to provide you with excellent care. Hearing back from our patients is one way we can continue to improve our services. Please take a few minutes to complete the written survey that you may receive in the mail after your visit with us. Thank you!             Your Updated Medication List - Protect others around you: Learn how to safely use, store and throw away your medicines at www.disposemymeds.org.          This list is accurate as of 9/7/18  4:39 PM.  Always use your most recent med list.                   Brand Name Dispense Instructions for use Diagnosis    fish oil-omega-3 fatty acids 1000 MG capsule      Take 1 capsule by mouth daily.        METROCREAM 0.75 % cream   Generic drug:  metroNIDAZOLE      Apply topically 2 times daily        multivitamin per tablet      Take 1 tablet by mouth daily.        nystatin ointment    MYCOSTATIN    30 g    APPLY TOPICALLY TO AFFECTED AREA 2 TIMES DAILY    Dysuria        tretinoin 0.025 % cream    RETIN-A    135 g    daily    Actinic skin damage       VITAMIN D (CHOLECALCIFEROL) PO      Take by mouth daily

## 2018-09-07 NOTE — NURSING NOTE
"53 year old  Chief Complaint   Patient presents with     Physical       Blood pressure 122/64, pulse 61, temperature 97.8  F (36.6  C), temperature source Oral, height 5' 3.94\" (162.4 cm), weight 141 lb (64 kg), SpO2 98 %. Body mass index is 24.25 kg/(m^2).  Patient Active Problem List   Diagnosis     Lentigo maligna (H)     Benign neoplasm of choroid     Postmenopausal status       Wt Readings from Last 2 Encounters:   09/07/18 141 lb (64 kg)   08/18/17 136 lb 1.3 oz (61.7 kg)     BP Readings from Last 3 Encounters:   09/07/18 122/64   08/18/17 116/71   05/30/17 110/71         Current Outpatient Prescriptions   Medication     fish oil-omega-3 fatty acids (FISH OIL) 1000 MG capsule     metroNIDAZOLE (METROCREAM) 0.75 % cream     Multiple Vitamin (MULTIVITAMIN) per tablet     nystatin (MYCOSTATIN) ointment     VITAMIN D, CHOLECALCIFEROL, PO     tretinoin (RETIN-A) 0.025 % cream     No current facility-administered medications for this visit.        Social History   Substance Use Topics     Smoking status: Never Smoker     Smokeless tobacco: Never Used     Alcohol use Not on file       Health Maintenance Due   Topic Date Due     HIV SCREEN (SYSTEM ASSIGNED)  01/09/1982     HEPATITIS C SCREENING  01/09/1982     PAP SCREENING Q3 YR (SYSTEM ASSIGNED)  04/04/2017     PHQ-2 Q1 YR  04/22/2017     INFLUENZA VACCINE (1) 09/01/2018       Lab Results   Component Value Date    PAP NIL 04/04/2014       Keyla Maxwell MA  September 7, 2018 3:53 PM    "

## 2018-09-08 LAB
ANION GAP SERPL CALCULATED.3IONS-SCNC: 8 MMOL/L (ref 3–14)
BUN SERPL-MCNC: 10 MG/DL (ref 7–30)
CALCIUM SERPL-MCNC: 9.1 MG/DL (ref 8.5–10.1)
CHLORIDE SERPL-SCNC: 104 MMOL/L (ref 94–109)
CHOLEST SERPL-MCNC: 245 MG/DL
CO2 SERPL-SCNC: 27 MMOL/L (ref 20–32)
CREAT SERPL-MCNC: 0.71 MG/DL (ref 0.52–1.04)
GFR SERPL CREATININE-BSD FRML MDRD: 86 ML/MIN/1.7M2
GLUCOSE SERPL-MCNC: 103 MG/DL (ref 70–99)
HDLC SERPL-MCNC: 49 MG/DL
LDLC SERPL CALC-MCNC: 162 MG/DL
NONHDLC SERPL-MCNC: 196 MG/DL
POTASSIUM SERPL-SCNC: 4.8 MMOL/L (ref 3.4–5.3)
SODIUM SERPL-SCNC: 139 MMOL/L (ref 133–144)
TRIGL SERPL-MCNC: 169 MG/DL

## 2018-09-11 DIAGNOSIS — E78.5 HYPERLIPIDEMIA LDL GOAL <130: Primary | ICD-10-CM

## 2018-09-11 LAB
COPATH REPORT: NORMAL
PAP: NORMAL

## 2018-09-13 LAB
FINAL DIAGNOSIS: NORMAL
HPV HR 12 DNA CVX QL NAA+PROBE: NEGATIVE
HPV16 DNA SPEC QL NAA+PROBE: NEGATIVE
HPV18 DNA SPEC QL NAA+PROBE: NEGATIVE
SPECIMEN DESCRIPTION: NORMAL
SPECIMEN SOURCE CVX/VAG CYTO: NORMAL

## 2019-04-08 ENCOUNTER — TELEPHONE (OUTPATIENT)
Dept: FAMILY MEDICINE | Facility: CLINIC | Age: 54
End: 2019-04-08

## 2019-04-08 NOTE — TELEPHONE ENCOUNTER
[facility] Call Center    Phone Message    May a detailed message be left on voicemail: yes    Reason for Call: Other: Patient is going to [facility] & needs to know if she has had her Hepatitis B shot.  MyChart goes back a couple years and she can see the Hep A, but not sure about Hep B.  Please notify her either by phone or she does use MyChart.      Action Taken: Message routed to:  [facility]: [facility]

## 2019-04-08 NOTE — TELEPHONE ENCOUNTER
Called patient and informed her she needs second dose of Hep A and there is no record of her having Hep B vaccinations. Also, depending on her itinerary, she may need Yellow Fever vaccine. She should review her itinerary and the CDC website for more information, or she can return the call to clinic and speak to a nurse.   Sun Aggarwal RN  04/08/19  11:39 AM

## 2019-05-15 DIAGNOSIS — R30.0 DYSURIA: ICD-10-CM

## 2019-05-16 RX ORDER — NYSTATIN 100000 U/G
OINTMENT TOPICAL
Qty: 30 G | Refills: 1 | Status: SHIPPED | OUTPATIENT
Start: 2019-05-16 | End: 2020-07-06

## 2019-05-16 NOTE — TELEPHONE ENCOUNTER
Last visit 9/7/18, no future visit    Prescription approved per [facility] Ref[facility].

## 2019-07-02 ENCOUNTER — OFFICE VISIT (OUTPATIENT)
Dept: DERMATOLOGY | Facility: CLINIC | Age: 54
End: 2019-07-02
Payer: COMMERCIAL

## 2019-07-02 DIAGNOSIS — Z12.83 SKIN CANCER SCREENING: ICD-10-CM

## 2019-07-02 DIAGNOSIS — B95.61 STAPHYLOCOCCUS AUREUS SUPERFICIAL FOLLICULITIS: Primary | ICD-10-CM

## 2019-07-02 DIAGNOSIS — L73.9 STAPHYLOCOCCUS AUREUS SUPERFICIAL FOLLICULITIS: Primary | ICD-10-CM

## 2019-07-02 DIAGNOSIS — Z86.006 HISTORY OF MELANOMA IN SITU: ICD-10-CM

## 2019-07-02 RX ORDER — MUPIROCIN 20 MG/G
OINTMENT TOPICAL
Qty: 30 G | Refills: 1 | Status: SHIPPED | OUTPATIENT
Start: 2019-07-02 | End: 2022-07-07

## 2019-07-02 ASSESSMENT — PAIN SCALES - GENERAL: PAINLEVEL: NO PAIN (0)

## 2019-07-02 NOTE — PROGRESS NOTES
[facility] Dermatology Note    Dermatology Problem List:  1. Lentigo Maligna left nasal ala 2010, Mohs excision 2010 with reconstruction by plastics and surface treatments by Dr. Martinez in the year after surgery.  2. Choroidal nevi of the right eye, two: both benign, patient believes she was seen within the last year but is unsure of the date; followed by [facility]  3. Rosacea, tx with Metrogel, uses it 3-4 times per week, using retin A 1-2 times per week before bed mixed with moisturizer   4. Seborrheic Dermatitis  5. Dysplastic Nevus of the L lower buttock  6. Cherry angiomas  7. 5mm brown macule on the right sole of the foot  8. Fibrous papule on the left lateral nasal columella    Encounter Date: 2019    CC: scaly lesion on the collumela and skin check  Chief Complaint   Patient presents with     Derm Problem     Jesus is here for a skin check, has a concerning area on her nose.        History of Present Illness:  Ms. 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    Patient noticed a scab on her nose about a week ago, it formed a scab and she picked it off, but the scab did not go away and she now is more aware of the lesion  The lesion is not painful or itchy, and it has not bled  Patient has no other concerning lesions on her body  Her Rosacea and seborrheic dermatitis is stable with occasional treatment with Metrogel and Retin A    Past Medical History:   Patient Active Problem List   Diagnosis     Lentigo maligna (H)     Benign neoplasm of choroid     Postmenopausal status     Past Medical History:   Diagnosis Date     Lentigo maligna (H)      Past Surgical History:   Procedure Laterality Date      SECTION       MOHS MICROGRAPHIC PROCEDURE       REPAIR MOHS          Social History:  Patient reports that she has never smoked. She has never used smokeless tobacco.    Family History:  Family History   Problem Relation Age of Onset     Lipids Mother      Hypertension Mother      Skin Cancer Mother      Diabetes Father      Hypertension Father      C.A.D. Maternal Grandfather         MI age 52     Cerebrovascular Disease Maternal Grandmother      Cerebrovascular Disease Paternal Grandmother      Melanoma No family hx of      Medications:  Current Outpatient Medications   Medication Sig Dispense Refill     fish oil-omega-3 fatty acids (FISH OIL) 1000 MG capsule Take 1 capsule by mouth daily.       metroNIDAZOLE (METROCREAM) 0.75 % cream Apply topically 2 times daily       Multiple Vitamin (MULTIVITAMIN) per tablet Take 1 tablet by mouth daily.       nystatin (MYCOSTATIN) 733354 UNIT/GM external ointment APPLY TOPICALLY TO AFFECTED AREA 2 TIMES DAILY (Patient not taking: Reported on 7/2/2019) 30 g 1     tretinoin (RETIN-A) 0.025 % cream daily (Patient not taking: Reported on 9/7/2018) 135 g 12     VITAMIN D, CHOLECALCIFEROL, PO Take by mouth daily          Allergies   Allergen Reactions     Penicillin G Hives     Benzoyl Peroxide Swelling and Rash       Review of Systems:  -Constitutional: Otherwise feeling well today, in usual state of health, no fevers, no chills, no unintended weight loss, no night sweats  -HEENT: Patient denies nonhealing oral sores.  -Skin: As above in HPI. No additional skin concerns.    Physical exam:  Vitals: There were no vitals taken for this visit.  GEN: This is a well developed, well-nourished female in no acute distress, in a pleasant mood.    SKIN: Full skin, which includes the head/face, both arms, chest, back, abdomen,both legs, genitalia and/or groin buttocks, digits and/or nails, was examined.  -There is no lymphadenopathy on palpation of cervical, post auricular, occipital, axillary, inguinal, and popliteal nodes.  -There are waxy stuck on tan  to brown papule diffusely on the back, abdomen, and neck  -Nance skin type II, fewer than 100 nevi  -On the left nasal columella, there is a pink smooth papule.  Not tender or friable.  No atypia on dermoscopy  -On the right nasal columella, there is a scaly erythematous papule extending into the right nostril, non-painful on palpation  -On the left nasal ala, there is a well-healed linear surgical scar  -On the right cheek, there is a smooth yellow telangiectatic papule  -There are erythematous papules and scattered telangectasias on the bilateral cheeks and nose.  -On the right sole of the foot, there is a 5mm, irregular brown macule with a regular reticular network of pigment  -Multiple regular brown pigmented macules and papules are identified on the chest, back, arms, legs.   -No other lesions of concern on areas examined.     Impression/Plan:      1. Nasal Staphylococcus folliculitis    Mupirocin ointment twice a daily.    We will clinically monitor this area.    Follow up PRN if not improving or worsening    2. History of melanoma in situ (lentigo maligna), no clinical evidence of recurrence    ABCDs of melanoma were discussed and self skin checks were advised.     Sunscreen: Apply 20 minutes prior to going outdoors and reapply every two hours, when wet or sweating. We recommend using an SPF 30 or higher, and to use one that is water resistant.      Continue with regular eye exams - patient is scheduled next week.        3. Benign skin findings (cherry angioma, seborrheic keratoses, normal nevi, sebaceous hyperplasia on right cheek, fibrous paule)  Benign lesions, no treatment needed    CC Referred Self, MD  No address on file on close of this encounter.  Follow-up in 6 months, earlier for new or changing lesions    Dr. Quinn staffed the patient.    Staff Involved:  .Sarah Laboy MD (James)  Dermatology Resident, PGY2  [facility]  .I, Anna Quinn MD, saw this patient with the resident  and agree with the resident s findings and plan of care as documented in the resident s note.

## 2019-07-02 NOTE — LETTER
7/2/2019       RE: Jesus Anglin  [address] [facility] [address] [facility] [id number]     Dear Colleague,    Thank you for referring your patient, Jesus Anglin, to the [facility] at [facility]. Please see a copy of my visit note below.    [facility] Dermatology Note    Dermatology Problem List:  1. Lentigo Maligna left nasal ala 7/2010, Mohs excision 8/2010 with reconstruction by plastics and surface treatments by Dr. Martinez in the year after surgery.  2. Choroidal nevi of the right eye, two: both benign, patient believes she was seen within the last year but is unsure of the date; followed by [facility]  3. Rosacea, tx with Metrogel, uses it 3-4 times per week, using retin A 1-2 times per week before bed mixed with moisturizer   4. Seborrheic Dermatitis  5. Dysplastic Nevus of the L lower buttock  6. Cherry angiomas  7. 5mm brown macule on the right sole of the foot  8. Fibrous papule on the left lateral nasal columella    Encounter Date: Jul 2, 2019    CC: scaly lesion on the collumela and skin check  Chief Complaint   Patient presents with     Derm Problem     Jesus is here for a skin check, has a concerning area on her nose.        History of Present Illness:  Ms. Jesus Anglin is a 54-year-old female with PMH of lentigo maligna of the left nasal ala on 7/2010, underwent Mohs 8/2010 with multiple reconstructions by plastic surgery and laser resurfacing for her scar presenting for full-body skin exam.    Patient noticed a scab on her nose about a week ago, it formed a scab and she picked it off, but the scab did not go away and she now is more aware of the lesion  The lesion is not painful or itchy, and it has not bled  Patient has no other concerning lesions on her body  Her Rosacea and seborrheic dermatitis is stable with occasional treatment with Metrogel and Retin A    Past Medical History:   Patient Active Problem List   Diagnosis      Lentigo maligna (H)     Benign neoplasm of choroid     Postmenopausal status     Past Medical History:   Diagnosis Date     Lentigo maligna (H)      Past Surgical History:   Procedure Laterality Date      SECTION       MOHS MICROGRAPHIC PROCEDURE       REPAIR MOHS         Social History:  Patient reports that she has never smoked. She has never used smokeless tobacco.    Family History:  Family History   Problem Relation Age of Onset     Lipids Mother      Hypertension Mother      Skin Cancer Mother      Diabetes Father      Hypertension Father      C.A.D. Maternal Grandfather         MI age 52     Cerebrovascular Disease Maternal Grandmother      Cerebrovascular Disease Paternal Grandmother      Melanoma No family hx of      Medications:  Current Outpatient Medications   Medication Sig Dispense Refill     fish oil-omega-3 fatty acids (FISH OIL) 1000 MG capsule Take 1 capsule by mouth daily.       metroNIDAZOLE (METROCREAM) 0.75 % cream Apply topically 2 times daily       Multiple Vitamin (MULTIVITAMIN) per tablet Take 1 tablet by mouth daily.       nystatin (MYCOSTATIN) 936549 UNIT/GM external ointment APPLY TOPICALLY TO AFFECTED AREA 2 TIMES DAILY (Patient not taking: Reported on 2019) 30 g 1     tretinoin (RETIN-A) 0.025 % cream daily (Patient not taking: Reported on 2018) 135 g 12     VITAMIN D, CHOLECALCIFEROL, PO Take by mouth daily          Allergies   Allergen Reactions     Penicillin G Hives     Benzoyl Peroxide Swelling and Rash       Review of Systems:  -Constitutional: Otherwise feeling well today, in usual state of health, no fevers, no chills, no unintended weight loss, no night sweats  -HEENT: Patient denies nonhealing oral sores.  -Skin: As above in HPI. No additional skin concerns.    Physical exam:  Vitals: There were no vitals taken for this visit.  GEN: This is a well developed, well-nourished female in no acute distress, in a pleasant mood.    SKIN: Full skin, which includes  the head/face, both arms, chest, back, abdomen,both legs, genitalia and/or groin buttocks, digits and/or nails, was examined.  -There is no lymphadenopathy on palpation of cervical, post auricular, occipital, axillary, inguinal, and popliteal nodes.  -There are waxy stuck on tan to brown papule diffusely on the back, abdomen, and neck  -Nance skin type II, fewer than 100 nevi  -On the left nasal columella, there is a pink smooth papule.  Not tender or friable.  No atypia on dermoscopy  -On the right nasal columella, there is a scaly erythematous papule extending into the right nostril, non-painful on palpation  -On the left nasal ala, there is a well-healed linear surgical scar  -On the right cheek, there is a smooth yellow telangiectatic papule  -There are erythematous papules and scattered telangectasias on the bilateral cheeks and nose.  -On the right sole of the foot, there is a 5mm, irregular brown macule with a regular reticular network of pigment  -Multiple regular brown pigmented macules and papules are identified on the chest, back, arms, legs.   -No other lesions of concern on areas examined.     Impression/Plan:      1. Nasal Staphylococcus folliculitis    Mupirocin ointment twice a daily.    We will clinically monitor this area.    Follow up PRN if not improving or worsening    2. History of melanoma in situ (lentigo maligna), no clinical evidence of recurrence    ABCDs of melanoma were discussed and self skin checks were advised.     Sunscreen: Apply 20 minutes prior to going outdoors and reapply every two hours, when wet or sweating. We recommend using an SPF 30 or higher, and to use one that is water resistant.      Continue with regular eye exams - patient is scheduled next week.        3. Benign skin findings (cherry angioma, seborrheic keratoses, normal nevi, sebaceous hyperplasia on right cheek, fibrous paule)  Benign lesions, no treatment needed    CC Referred Self, MD  No address on file  on close of this encounter.  Follow-up in 6 months, earlier for new or changing lesions    Dr. Quinn staffed the patient.    Staff Involved:  .Sarah Laboy MD (James)  Dermatology Resident, PGY2  [facility]  .I, Anna Quinn MD, saw this patient with the resident and agree with the resident s findings and plan of care as documented in the resident s note.    Again, thank you for allowing me to participate in the care of your patient.      Sincerely,    Anna Quinn MD

## 2019-07-02 NOTE — NURSING NOTE
Dermatology Rooming Note    Jesus TONEY Linnette's goals for this visit include:   Chief Complaint   Patient presents with     Derm Problem     Jesus is here for a skin check, has a concerning area on her nose.        Harini Cooley LPN

## 2019-08-30 ENCOUNTER — ANCILLARY PROCEDURE (OUTPATIENT)
Dept: MAMMOGRAPHY | Facility: CLINIC | Age: 54
End: 2019-08-30
Attending: FAMILY MEDICINE
Payer: COMMERCIAL

## 2019-08-30 DIAGNOSIS — Z12.31 VISIT FOR SCREENING MAMMOGRAM: ICD-10-CM

## 2019-11-05 ENCOUNTER — HEALTH MAINTENANCE LETTER (OUTPATIENT)
Age: 54
End: 2019-11-05

## 2020-02-25 ENCOUNTER — TRANSFERRED RECORDS (OUTPATIENT)
Dept: HEALTH INFORMATION MANAGEMENT | Facility: CLINIC | Age: 55
End: 2020-02-25

## 2020-06-27 DIAGNOSIS — R30.0 DYSURIA: ICD-10-CM

## 2020-06-30 NOTE — TELEPHONE ENCOUNTER
Kiran pt, last seen 9/7/18, no future visit    Medication is being filled for 1 time refill only due to:  Patient needs to be seen because it has been more than one year since last visit.   Alisa You RN  [facility]

## 2020-07-06 RX ORDER — NYSTATIN 100000 U/G
OINTMENT TOPICAL
Qty: 30 G | Refills: 0 | Status: SHIPPED | OUTPATIENT
Start: 2020-07-06 | End: 2021-06-04

## 2020-09-11 DIAGNOSIS — Z12.31 VISIT FOR SCREENING MAMMOGRAM: ICD-10-CM

## 2020-09-29 ENCOUNTER — TRANSFERRED RECORDS (OUTPATIENT)
Dept: HEALTH INFORMATION MANAGEMENT | Facility: CLINIC | Age: 55
End: 2020-09-29

## 2020-11-02 ENCOUNTER — OFFICE VISIT (OUTPATIENT)
Dept: DERMATOLOGY | Facility: CLINIC | Age: 55
End: 2020-11-02
Payer: COMMERCIAL

## 2020-11-02 DIAGNOSIS — L73.8 SEBACEOUS HYPERPLASIA: ICD-10-CM

## 2020-11-02 DIAGNOSIS — D22.39 FIBROUS PAPULE OF NOSE: ICD-10-CM

## 2020-11-02 DIAGNOSIS — L57.8 ACTINIC SKIN DAMAGE: ICD-10-CM

## 2020-11-02 DIAGNOSIS — L71.9 ROSACEA: ICD-10-CM

## 2020-11-02 DIAGNOSIS — Z86.006 HISTORY OF MELANOMA IN SITU: Primary | ICD-10-CM

## 2020-11-02 PROCEDURE — 99213 OFFICE O/P EST LOW 20 MIN: CPT | Mod: GC | Performed by: DERMATOLOGY

## 2020-11-02 RX ORDER — TRETINOIN 0.25 MG/G
CREAM TOPICAL
Qty: 45 G | Refills: 11 | Status: SHIPPED | OUTPATIENT
Start: 2020-11-02 | End: 2021-11-02

## 2020-11-02 RX ORDER — TRETINOIN 0.25 MG/G
CREAM TOPICAL
Qty: 45 G | Refills: 11 | Status: SHIPPED | OUTPATIENT
Start: 2020-11-02 | End: 2020-11-02

## 2020-11-02 ASSESSMENT — PAIN SCALES - GENERAL: PAINLEVEL: NO PAIN (0)

## 2020-11-02 NOTE — PROGRESS NOTES
[facility] Dermatology Note      Dermatology Problem List:  1. Lentigo maligna, left nasal ala  - Mohs excision 2010 with reconstruction by plastics and surface treatments by Dr. Martinez in the year after surgery.  2. 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 Rosacea  - metronidazole, tretinoin 0.025% cream   4. Seborrheic Dermatitis  5. Dysplastic Nevus of the L lower buttock  6. Cherry angiomas  7. 5 mm brown macule on the right sole of the foot  8. Fibrous papule on the left lateral nasal columella    Encounter Date: 2020    CC:   Chief Complaint   Patient presents with     Derm Problem     Jesus is here for a skin check, states no concerns.      History of Present Illness:  Ms. Jesus Anglin is a 55 year old female who with a history of skin cancer presents for skin check.'    Had a red spot on the L hip that turned dark and then fell off. Has not recurred. Also had a rough spot on her back that she scratched off during yoga. Not painful or tender. Otherwise no painful, bleeding, non-healing, or otherwise symptomatic lesions. Wears sunscreen consistently. Otherwise in usual state of health. No additional skin concerns.    Past Medical History:   Patient Active Problem List   Diagnosis     Lentigo maligna (H)     Benign neoplasm of choroid     Postmenopausal status     Past Medical History:   Diagnosis Date     Lentigo maligna (H)      Past Surgical History:   Procedure Laterality Date      SECTION       MOHS MICROGRAPHIC PROCEDURE       REPAIR MOHS       Social History:  Patient reports that she has never smoked. She has never used smokeless tobacco. Son is getting his LUCAS at [facility].     Family History:  Family History   Problem Relation Age of Onset     Lipids Mother      Hypertension Mother      Skin Cancer Mother      Diabetes Father      Hypertension Father      C.A.D. Maternal Grandfather         MI age 52     Cerebrovascular Disease  Maternal Grandmother      Cerebrovascular Disease Paternal Grandmother      Melanoma No family hx of        Medications:  Current Outpatient Medications   Medication Sig Dispense Refill     Cyanocobalamin (B-12 PO)        metroNIDAZOLE (METROCREAM) 0.75 % cream Apply topically 2 times daily       Multiple Vitamin (MULTIVITAMIN) per tablet Take 1 tablet by mouth daily.       nystatin (MYCOSTATIN) 115698 UNIT/GM external ointment Apply topically 2 times daily as needed to affected area - needs clinic visit for further refills 30 g 0     VITAMIN D, CHOLECALCIFEROL, PO Take by mouth daily       fish oil-omega-3 fatty acids (FISH OIL) 1000 MG capsule Take 1 capsule by mouth daily.       mupirocin (BACTROBAN) 2 % external ointment Use 2 times a day to affected area. (Patient not taking: Reported on 11/2/2020) 30 g 1     tretinoin (RETIN-A) 0.025 % cream daily (Patient not taking: Reported on 9/7/2018) 135 g 12        Allergies   Allergen Reactions     Penicillin G Hives     Benzoyl Peroxide Swelling and Rash     Review of Systems:  -Constitutional: Denies fevers, chills, night sweats, or unintended weight loss.  -HEENT: Patient denies nonhealing oral sores.  -Skin: As above in HPI. No additional skin concerns.    Physical exam:  Vitals: There were no vitals taken for this visit.  GEN: This is a well developed, well-nourished female in no acute distress, in a pleasant mood.    SKIN: Full skin, which includes the head/face, both arms, chest, back, abdomen,both legs, genitalia and/or groin buttocks, digits and/or nails, was examined.  -Nance skin type: I-II  -Dorsal nose with well healed linear surgical scar  -L nasal ala with well healed hypopigmented surgical scar with overlying telangiectasias  -L lateral nasal columella with skin colored papule  -There are yellow oily papules with central umbilication located on the cheeks and forehead  -R plantar foot with brown macule with pigmented mainly in the furrows on  dermoscopy  -There are waxy stuck on tan to brown papules on the trunk and extremities  -There are dome shaped bright red papules on the trunk and extremities.   -No other lesions of concern on areas examined    Impression/Plan:  1. Lentigo maligna, left nasal ala  S/p MMS 2010. No evidence of recurrence today.   - Sun precaution was advised including the use of sun screens of SPF 30 or higher, sun protective clothing, and avoidance of tanning beds.  - No further intervention required. Patient to report changes.     2. Multiple clinically benign nevi on the trunk and extremities, sebaceous hyperplasia, fibrous papule, seborrheic keratoses, cherry angiomas   Benign, reassured.   - No further intervention required. Patient to report changes.     3. Rosacea  Well controlled with topicals.   - Continue metronidazole 0.75% cream daily  - Continue tretinoin 0.025% cream nightly    Follow-up in 1 year, earlier for new or changing lesions.     Dr. Quinn staffed the patient.    Staff Involved:  Maria Guadalupe Hurtado MD (PGY3)/Staff  I, Anna Quinn MD, saw this patient with the resident and agree with the resident s findings and plan of care as documented in the resident s note.

## 2020-11-02 NOTE — LETTER
2020       RE: Jesus Anglin  [address] [id number]     Dear Colleague,    Thank you for referring your patient, Jesus Anglin, to the [facility] [place] at [facility]. Please see a copy of my visit note below.    [facility] Dermatology Note      Dermatology Problem List:  1. Lentigo maligna, left nasal ala  - Mohs excision 2010 with reconstruction by plastics and surface treatments by Dr. Martinez in the year after surgery.  2. 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 Rosacea  - metronidazole, tretinoin 0.025% cream   4. Seborrheic Dermatitis  5. Dysplastic Nevus of the L lower buttock  6. Cherry angiomas  7. 5 mm brown macule on the right sole of the foot  8. Fibrous papule on the left lateral nasal columella    Encounter Date: 2020    CC:   Chief Complaint   Patient presents with     Derm Problem     Jesus is here for a skin check, states no concerns.      History of Present Illness:  Ms. Jesus Anglin is a 55 year old female who with a history of skin cancer presents for skin check.'    Had a red spot on the L hip that turned dark and then fell off. Has not recurred. Also had a rough spot on her back that she scratched off during yoga. Not painful or tender. Otherwise no painful, bleeding, non-healing, or otherwise symptomatic lesions. Wears sunscreen consistently. Otherwise in usual state of health. No additional skin concerns.    Past Medical History:   Patient Active Problem List   Diagnosis     Lentigo maligna (H)     Benign neoplasm of choroid     Postmenopausal status     Past Medical History:   Diagnosis Date     Lentigo maligna (H)      Past Surgical History:   Procedure Laterality Date      SECTION       MOHS MICROGRAPHIC PROCEDURE       REPAIR MOHS       Social History:  Patient reports that she has never smoked. She has never  used smokeless tobacco. Son is getting his LUCAS at [facility].     Family History:  Family History   Problem Relation Age of Onset     Lipids Mother      Hypertension Mother      Skin Cancer Mother      Diabetes Father      Hypertension Father      C.A.D. Maternal Grandfather         MI age 52     Cerebrovascular Disease Maternal Grandmother      Cerebrovascular Disease Paternal Grandmother      Melanoma No family hx of        Medications:  Current Outpatient Medications   Medication Sig Dispense Refill     Cyanocobalamin (B-12 PO)        metroNIDAZOLE (METROCREAM) 0.75 % cream Apply topically 2 times daily       Multiple Vitamin (MULTIVITAMIN) per tablet Take 1 tablet by mouth daily.       nystatin (MYCOSTATIN) 111413 UNIT/GM external ointment Apply topically 2 times daily as needed to affected area - needs clinic visit for further refills 30 g 0     VITAMIN D, CHOLECALCIFEROL, PO Take by mouth daily       fish oil-omega-3 fatty acids (FISH OIL) 1000 MG capsule Take 1 capsule by mouth daily.       mupirocin (BACTROBAN) 2 % external ointment Use 2 times a day to affected area. (Patient not taking: Reported on 11/2/2020) 30 g 1     tretinoin (RETIN-A) 0.025 % cream daily (Patient not taking: Reported on 9/7/2018) 135 g 12        Allergies   Allergen Reactions     Penicillin G Hives     Benzoyl Peroxide Swelling and Rash     Review of Systems:  -Constitutional: Denies fevers, chills, night sweats, or unintended weight loss.  -HEENT: Patient denies nonhealing oral sores.  -Skin: As above in HPI. No additional skin concerns.    Physical exam:  Vitals: There were no vitals taken for this visit.  GEN: This is a well developed, well-nourished female in no acute distress, in a pleasant mood.    SKIN: Full skin, which includes the head/face, both arms, chest, back, abdomen,both legs, genitalia and/or groin buttocks, digits and/or nails, was examined.  -Nance skin type: I-II  -Dorsal nose with well healed linear surgical  scar  -L nasal ala with well healed hypopigmented surgical scar with overlying telangiectasias  -L lateral nasal columella with skin colored papule  -There are yellow oily papules with central umbilication located on the cheeks and forehead  -R plantar foot with brown macule with pigmented mainly in the furrows on dermoscopy  -There are waxy stuck on tan to brown papules on the trunk and extremities  -There are dome shaped bright red papules on the trunk and extremities.   -No other lesions of concern on areas examined    Impression/Plan:  1. Lentigo maligna, left nasal ala  S/p MMS 2010. No evidence of recurrence today.   - Sun precaution was advised including the use of sun screens of SPF 30 or higher, sun protective clothing, and avoidance of tanning beds.  - No further intervention required. Patient to report changes.     2. Multiple clinically benign nevi on the trunk and extremities, sebaceous hyperplasia, fibrous papule, seborrheic keratoses, cherry angiomas   Benign, reassured.   - No further intervention required. Patient to report changes.     3. Rosacea  Well controlled with topicals.   - Continue metronidazole 0.75% cream daily  - Continue tretinoin 0.025% cream nightly    Follow-up in 1 year, earlier for new or changing lesions.     Dr. Quinn staffed the patient.    Staff Involved:  Maria Guadalupe Hurtado MD (PGY3)/Staff  I, Anna Quinn MD, saw this patient with the resident and agree with the resident s findings and plan of care as documented in the resident s note.

## 2020-11-02 NOTE — PATIENT INSTRUCTIONS
"Topical retinoids (tretinoin, adapalene, or tazarotene)  - After washing your face at nighttime, apply a small pea-sized thinly and evenly across the face (similar amount for other areas). Do not just put it on the pimples; put it all over, and leave it on (do not rinse off). Do not apply during the day as this medication is inactivated by the sunlight and also make you more sensitive to the sun.   - This medication can cause redness and irritation, however, this will get better as you continue using the product. Start by using this medication 1-3 times per night, and then gradually build up to every other night and then every night as you tolerate. You can also use more moisturizer (either apply on first, or mix in with the medicated cream). Make sure the moisturizer does not contain any benzoyl peroxide or other \"active\" ingredients. Cerave, cetaphil, and vanicream are great options.   - Retinoids make your skin more sensitive, so if you have another skin treatment (like a facial or eyebrow or other waxing), be sure to let the salon person know.   - There are many different types and strengths of retinoids lotions, so if you are having trouble tolerating yours, paying for yours, etc, please let your doctor know.   - Do not use if you are pregnant or breastfeeding.             Sun Protection    Sun protective Clothing:  www.coolibar.com  www.sunprecautions.com  [email or web address]    Do I need tinted sunscreen?  There is more and more research showing that visible light can also lead to discoloration (such as melasma). Tinted sunscreens (which contain iron oxides) protect against visible light as an added bonus.     Physical/Mineral Sunscreens (in no particular order)  Elta MD UV Physical Broad-Spectrum SPF 41 Sunscreen (Tinted, $33)  Skin Ceuticals Physical Fusion UV Defense SPF 50 (Tinted, $34)  Unsun Mineral Tinted Face Sunscreen (Tinted, with 2 shade ranges, $29)  It Cosmetics CC+ Cream with SPF 50+ (Tinted, can " "also double as foundation/coverage -- great range of shades, $40)  Biossance Squalane + Zinc Sheer Mineral Sunscreen SPF 30 PA +++ (goes on white then blends in, $30)  Cerave 100% Mineral Sunscreen SPF 50 Face (good for sensitive skin, $15)  La Roche Posay Anthelios Mineral Zinc Oxide Sunscreen SPF 50 ($35)  La Roche Posay Anthelios Mineral Tinted Sunscreen for Face SPF 50 ($35)  Think Sport Sunscreen (great for sports, though has more of a white cast, $20)  Think Baby Sunscreen (for kids, $21)  Color Science Sunforgettable Total Protection Brush On Shield SPF 50 (Multiple tints, $130)    Chemical Sunscreens  Nevaeh Rouleau Weightless Protection SPF 30 ($48)  Madison SPF Brightening Moisturizer ($30)  Urban Skin Complexion Protection Moisturizer SPF 30 ($20)  Total Defense + Repair Broad Spectrum SPF 34 ($68)  Clarins UV PLUS Anti-Pollution Sunscreen Multi-Protection Tint SPF 50 (Multiple tints, $45)  Neutrogena Healthy Skin Glow Sheers Tinted Moisturizer with SPF 20 (Multiple tints, $11)    The ABCDEs of Melanoma  Skin cancer can develop anywhere on the skin. Once a month, take a look at your entire body and note any changing moles or spots. Ask someone for help when checking your skin, especially for hard to see places such as your back. If you notice a mole that looks different from others, or one that changes, enlarges, itches, or bleeds, you should see a dermatologist.    Asymmetry, Border (irregularity), Color (not uniform, changes in color), Diameter (greater than 6 mm which is about the size of a pencil eraser), and Evolving (any changes in pre-existing moles). In short, look for the \"ugly duckling.\" You want all of the spots on your body to look like cousins (like they could be related). If something stands out, take a photo of it and make an appointment to have it evaluated.     Gentle Skin Care Recommendations    Bathing   - limit showers to once daily, 15 minutes or less, with warm water   - use gentle " soaps that are free of colors and scents and are not too strong of cleansers  - consider limiting soap to only the 'dirty' areas, like the armpits  and groin as much as possible to prevent stripping your skin in other areas of their natural moisture  - do not vigorously scrub when cleansing  - avoid any soaps with microbeads  - after showering, pat your skin gently dry with a towel  - make sure you are applying a good moisturizer after bathing every time (see below)  - do not take bubble baths as many of these products contain harsh chemicals that can irritate the skin    Examples of gentle cleansers:   - Mild bar soaps: Vanicream bar soap, Neutragena glycerin bar, Dove sensitive skin (fragrance-free)   - Mild liquid soaps: Vanicream liquid cleanser, Cerave liquid cleanser    Moisturizing     It is important to moisturize at least twice per day to the whole body. IMMEDIATELY after getting out of the shower, apply a moisturizer (preferably from the list below) to the entire body. If you have been prescribed any medications, apply those medications to the skin first and then you can apply the moisturizer on top.    Moisturizers come in a variety of types some of which are greasier, heavier, or lighter. The heaviest moisturizers are ointments, which are greasy like vaseline. The next best are creams, which are usually white and thick but absorb into the skin a little better. The lightest are lotions which are typically thin and contain more ingredients which can be irritating to your skin. For this reason, we do NOT recommend lotions.     Examples of good moisturizers:   - Ointments: vaseline, Cerave healing ointment, Vaniply, coconut oil   - Creams: Cerave, Vanicream, Neutrogena Norwegian Formula Hand Cream     Laundry     Be sure to use laundry products that are free of dyes and fragrances--many laundry products that claim to be fragrance-free actually are not free of these! Do not use laundry softeners or dryer  "sheets.     Good laundry products include:  - 7th generation Natural Laundry Detergent Liquid, 7th Generation Free and Clear Natural Laundry Detergent packs, 7th Generation Free and Clear natural 4x Concentrated Laundry Detergent, ECOS hypoallergenic laundry detergent, free & clear, ERA Ultra Era Free Liquid Laundry Detergent, All Free & Clear     Other Gentle Skin Recommendations    - Do not use products such as powders, perfumes, or colognes on your skin  - Avoid saunas and steam baths - these temperatures are too hot   - Avoid tight or  scratchy  clothing such as wool  - Always wash new clothing before wearing them for the first time  - Sometimes a humidifier or vaporizer, used at night can help the dry skin - but remember to keep it clean to void mold growth.  - Avoid applying essential oils to the skin or diffusing in the home (Many essential oils can be irritate to the skin!   - Keep in mind, just because something is \"natural\" it does not mean that it cannot irritate your skin (for example, poison ivy is natural, but will cause a painful rash!)    [employer].   "

## 2020-11-02 NOTE — NURSING NOTE
Dermatology Rooming Note    Jesus Anglin's goals for this visit include:   Chief Complaint   Patient presents with     Derm Problem     Jesus is here for a skin check, states no concerns.        Harini Cooley LPN

## 2020-11-22 ENCOUNTER — HEALTH MAINTENANCE LETTER (OUTPATIENT)
Age: 55
End: 2020-11-22

## 2021-04-12 ENCOUNTER — IMMUNIZATION (OUTPATIENT)
Dept: NURSING | Facility: CLINIC | Age: 56
End: 2021-04-12
Payer: COMMERCIAL

## 2021-04-12 PROCEDURE — 91300 PR COVID VAC PFIZER DIL RECON 30 MCG/0.3 ML IM: CPT

## 2021-04-12 PROCEDURE — 0001A PR COVID VAC PFIZER DIL RECON 30 MCG/0.3 ML IM: CPT

## 2021-05-03 ENCOUNTER — IMMUNIZATION (OUTPATIENT)
Dept: NURSING | Facility: CLINIC | Age: 56
End: 2021-05-03
Attending: INTERNAL MEDICINE
Payer: COMMERCIAL

## 2021-05-03 PROCEDURE — 0002A PR COVID VAC PFIZER DIL RECON 30 MCG/0.3 ML IM: CPT

## 2021-05-03 PROCEDURE — 91300 PR COVID VAC PFIZER DIL RECON 30 MCG/0.3 ML IM: CPT

## 2021-06-01 NOTE — PROGRESS NOTES
SUBJECTIVE:   CC: {Jesus Anglin is an {56 year old woman who is a new patient to this provider.  Was a patient of Dr. Beck. She presents for preventive health visit.   Her last CPE was was 2018. She has a history of lentigo maligna and she has a sibling who has had melanoma..   She has the following concerns she would like addressed today:      Hyperlipidemia Follow-Up: Patient is not taking medication for hyperlipidemia but is managing with diet and exercise.  In the last 2 years she has changed to a mostly vegan diet.  She will occasionally eat fish.  All cardiovascular risk factors were reviewed.  Cardiovascular risk score was calculated and discussed.    Are you regularly taking any medication or supplement to lower your cholesterol?   No    Are you having muscle aches or other side effects that you think could be caused by your cholesterol lowering medication?  No     Recent Labs   Lab Test 18  1628 14  0829   CHOL 245* 209.0*   HDL 49* 55.0   * 143.0*   TRIG 169* 53.0   CHOLHDLRATIO  --  3.8     The 10-year ASCVD risk score ([place] JARET Jr., et al., 2013) is: 2.4%*    Values used to calculate the score:      Age: 56 years      Sex: Female      Is Non- : No      Diabetic: No      Tobacco smoker: No      Systolic Blood Pressure: 121 mmHg      Is BP treated: No      HDL Cholesterol: 54 mg/dL*      Total Cholesterol: 214 mg/dL*      * - Cholesterol units were assumed for this score calculation    Lentigo maligna: Patient is followed by dermatology at [facility] on a regular basis for surveillance.  It is also notable that the patient has a sister who has had melanoma.      GYN history:  Menopause symptoms: No menopausal symptoms.  Hot flashes have resolved completely.  She has had her Mirena IUD out for probably 4 years.  She has had no vaginal burning, itch, discharge or bleeding.  Currently sexually active: yes    Contraception: NA  {No LMP recorded (lmp  unknown). Patient is postmenopausal.  Vaginal symptoms: None  Concern for STD: no    Accepts/requests STD testing: declined  History of abnormal Pap smear: NO - age 30-65 PAP every 5 years with negative HPV co-testing recommended    Health maintenance:  Mammogram: 2020--order placed  Colonoscopy: --repeat 10 years  PAP:   Lab Results   Component Value Date    PAP NIL 2018  Neg HPV repeat 5 years    PAP NIL 2014    PAP NIL 06/15/2010       Immunizations:  All up to date    Healthy Habits:    Do you get at least three servings of calcium containing foods daily (dairy, green leafy vegetables, etc.)? no, taking calcium and/or vitamin D supplement: yes -recommendations for vitamin D and calcium were reviewed in detail handout.      PHQ-2 Score:     PHQ-2 (  [employer]) 2021   Q1: Little interest or pleasure in doing things 0 0   Q2: Feeling down, depressed or hopeless 0 0   PHQ-2 Score 0 0          Patient Active Problem List    Diagnosis Date Noted     Nevus of choroid of right eye 2018     Priority: Medium     Drusen stage macular degeneration 2018     Priority: Medium     Postmenopausal status 2017     Priority: Medium     Benign neoplasm of choroid 2012     Priority: Medium     Lentigo maligna (H) 2011     Priority: Medium       Past Medical History:   Diagnosis Date     Lentigo maligna (H)        Past Surgical History:   Procedure Laterality Date      SECTION       MOHS MICROGRAPHIC PROCEDURE       REPAIR MOHS         Family History   Problem Relation Age of Onset     Lipids Mother      Hypertension Mother      Skin Cancer Mother         BCC and SCC     Diabetes Father      Hypertension Father      Hyperlipidemia Father      C.A.D. Maternal Grandfather         MI age 52     Cerebrovascular Disease Maternal Grandmother      Cerebrovascular Disease Paternal Grandmother      Melanoma Sister      Diabetes Sister        Social History     Tobacco  Use     Smoking status: Never Smoker     Smokeless tobacco: Never Used   Substance Use Topics     Alcohol use: Yes     Alcohol/week: 2.0 standard drinks     Types: 2 Standard drinks or equivalent per week     Frequency: 2-3 times a week     Drinks per session: 1 or 2     Binge frequency: Never       Social History     Social History Narrative    Lives with  and all three sons are at home right now. Life is going well.  No pets.        Has a good support system. Family is close.    Feels safe in all environments.    Wears seatbelt 100% of the time    Never bikes.    Regular dental visits: yes    Do you have glasses or contacts: no    Have you seen an eye MD in the past 2 years: Yes due to retinal lesion    Do you snore: yes.  Sleep apnea: No    Caffeine: Drinks per day: 2 cups per AM    Denies history of abuse, past or present, physical, sexual or emotional.    Exefcises most dsays.         Pilar Ball PA-C    06/02/21       Current Outpatient Medications   Medication Sig Dispense Refill     cholecalciferol (D3) 50 MCG (2000 UT) tablet        Cyanocobalamin (B-12 PO)        metroNIDAZOLE (METROCREAM) 0.75 % external cream Apply to face 1-2 times a day. 45 g 11     nystatin (MYCOSTATIN) 257119 UNIT/GM external ointment Apply topically 2 times daily as needed to affected area - needs clinic visit for further refills 30 g 0     mupirocin (BACTROBAN) 2 % external ointment Use 2 times a day to affected area. (Patient not taking: Reported on 11/2/2020) 30 g 1     tretinoin (RETIN-A) 0.025 % external cream Apply to face every night 45 g 11                          Reviewed orders with patient.  Reviewed health maintenance and updated orders accordingly - Yes      ROS:  CONSTITUTIONAL: NEGATIVE for fever, chills, change in weight  INTEGUMENTARY/SKIN: NEGATIVE for worrisome rashes, moles or lesions  EYES: NEGATIVE for vision changes or irritation  ENT: NEGATIVE for ear, mouth and throat problems  RESP: NEGATIVE for  "significant cough or SOB  BREAST: NEGATIVE for masses, tenderness or discharge  CV: NEGATIVE for chest pain, palpitations or peripheral edema  GI: NEGATIVE for nausea, abdominal pain, heartburn, or change in bowel habits  : NEGATIVE for unusual urinary or vaginal symptoms. No vaginal bleeding.  MUSCULOSKELETAL: NEGATIVE for significant arthralgias or myalgia  NEURO: NEGATIVE for weakness, dizziness or paresthesias  ENDOCRINE: NEGATIVE for temperature intolerance, skin/hair changes  HEME/ALLERGY/IMMUNE: NEGATIVE for bleeding problems  PSYCHIATRIC: NEGATIVE for changes in mood or affect     OBJECTIVE:   /81   Pulse 59   Temp 97.3  F (36.3  C)   Resp 12   Ht 1.624 m (5' 3.94\")   Wt 59 kg (130 lb)   LMP  (LMP Unknown)   SpO2 96%   Breastfeeding No   BMI 22.36 kg/m      EXAM:  GENERAL: healthy, alert and no distress  EYES: Eyes grossly normal to inspection, PERRL and conjunctivae and sclerae normal  HENT: ear canals and TM's normal, nose and mouth without ulcers or lesions  NECK: no adenopathy, no asymmetry, masses, or scars and thyroid normal to palpation.  No bruits  RESP: lungs clear to auscultation - no rales, rhonchi or wheezes  BREAST: normal without masses, tenderness or nipple discharge and no palpable axillary masses or adenopathy  CV: regular rate and rhythm, normal S1 S2, no S3 or S4, no murmur, click or rub, no peripheral edema and peripheral pulses strong  ABDOMEN: soft, nontender, no hepatosplenomegaly, no masses and bowel sounds normal  MS: no gross musculoskeletal defects noted, no clubbing, edema or cyanosis of extremities.  Pulses = and appropriate bilaterally to DP and PT   SKIN: no suspicious lesions or rashes  NEURO: Normal strength and tone, mentation intact and speech normal  PSYCH: mentation appears normal, affect normal/bright  LYMPH: no cervical, supraclavicular, axillary, or inguinal adenopathy      ASSESSMENT/PLAN:   1. Routine general medical examination at a health [facility] " facility  - Hepatitis C Screen Reflex to HCV RNA Quant and Genotype  - Mammogram - routine screening; Future    2. Hyperlipidemia LDL goal <160  - Lipid Panel Plus ([facility])    3. Encounter for biometric screening  Patient will send me a form for completion for her workplace.    4. Postmenopausal status  --No concerns or symptoms at this time.    5. Lentigo maligna (H)  Continue follow-up with dermatology for regular surveillance.    6.  Nevus choriod: She is followed regularly for this by ophthalmology.      COUNSELING:   Reviewed preventive health counseling, as reflected in patient instructions  Special attention given to:        Regular exercise       Healthy diet/nutrition       Vision screening       Hearing screening       Osteoporosis prevention/bone health       Colon cancer screening       Consider Hep C screening for all patients one time for ages 18-79 years       HIV screeninx in teen years, 1x in adult years, and at intervals if high risk       (Mena)menopause management       Advance Care Planning    BP Readings from Last 3 Encounters:   21 121/81   18 122/64   17 116/71      Body mass index is 22.36 kg/m .            History   Smoking Status     Never Smoker   Smokeless Tobacco     Never Used            Counseling Resources:  ATP IV Guidelines  Pooled Cohorts Equation Calculator  Breast Cancer Risk Calculator  FRAX Risk Assessment  ICSI Preventive Guidelines  Dietary Guidelines for Americans,   [email or web address]'s MyPlate  ASA Prophylaxis  Lung CA Screening    Aziza Ball PA-C  [facility]

## 2021-06-02 ENCOUNTER — OFFICE VISIT (OUTPATIENT)
Dept: FAMILY MEDICINE | Facility: CLINIC | Age: 56
End: 2021-06-02
Payer: COMMERCIAL

## 2021-06-02 VITALS
BODY MASS INDEX: 22.2 KG/M2 | TEMPERATURE: 97.3 F | RESPIRATION RATE: 12 BRPM | DIASTOLIC BLOOD PRESSURE: 81 MMHG | HEART RATE: 59 BPM | HEIGHT: 64 IN | OXYGEN SATURATION: 96 % | SYSTOLIC BLOOD PRESSURE: 121 MMHG | WEIGHT: 130 LBS

## 2021-06-02 DIAGNOSIS — Z00.8 ENCOUNTER FOR BIOMETRIC SCREENING: ICD-10-CM

## 2021-06-02 DIAGNOSIS — E78.5 HYPERLIPIDEMIA LDL GOAL <160: ICD-10-CM

## 2021-06-02 DIAGNOSIS — Z00.00 ROUTINE GENERAL MEDICAL EXAMINATION AT A HEALTH CARE FACILITY: Primary | ICD-10-CM

## 2021-06-02 DIAGNOSIS — D03.9 LENTIGO MALIGNA (H): ICD-10-CM

## 2021-06-02 DIAGNOSIS — Z78.0 POSTMENOPAUSAL STATUS: ICD-10-CM

## 2021-06-02 PROBLEM — H35.369 DRUSEN STAGE MACULAR DEGENERATION: Status: ACTIVE | Noted: 2018-12-06

## 2021-06-02 PROBLEM — D31.31 NEVUS OF CHOROID OF RIGHT EYE: Status: ACTIVE | Noted: 2018-12-06

## 2021-06-02 PROBLEM — H31.009 CHORIORETINAL SCAR: Status: ACTIVE | Noted: 2018-12-06

## 2021-06-02 LAB
ALT SERPL-CCNC: 15 U/L (ref 0–50)
AST SERPL-CCNC: 25 U/L (ref 0–45)
CHOLEST SERPL-MCNC: 214 MG/DL (ref 0–200)
CHOLEST/HDLC SERPL: 3.9 {RATIO} (ref 0–5)
FASTING SPECIMEN: NO
GLUCOSE SERPL-MCNC: 98 MG/DL (ref 60–99)
HDLC SERPL-MCNC: 54 MG/DL
LDLC SERPL CALC-MCNC: 123 MG/DL (ref 0–129)
TRIGL SERPL-MCNC: 181 MG/DL (ref 0–150)
VLDL-CHOLESTEROL: 36 (ref 7–32)

## 2021-06-02 RX ORDER — CHOLECALCIFEROL (VITAMIN D3) 50 MCG
TABLET ORAL
COMMUNITY
End: 2023-07-20

## 2021-06-02 SDOH — SOCIAL STABILITY: SOCIAL NETWORK: ARE YOU MARRIED, WIDOWED, DIVORCED, SEPARATED, NEVER MARRIED, OR LIVING WITH A PARTNER?: NOT ASKED

## 2021-06-02 SDOH — ECONOMIC STABILITY: FOOD INSECURITY: WITHIN THE PAST 12 MONTHS, YOU WORRIED THAT YOUR FOOD WOULD RUN OUT BEFORE YOU GOT MONEY TO BUY MORE.: NEVER TRUE

## 2021-06-02 SDOH — ECONOMIC STABILITY: INCOME INSECURITY: HOW HARD IS IT FOR YOU TO PAY FOR THE VERY BASICS LIKE FOOD, HOUSING, MEDICAL CARE, AND HEATING?: NOT HARD AT ALL

## 2021-06-02 SDOH — SOCIAL STABILITY: SOCIAL NETWORK: HOW OFTEN DO YOU GET TOGETHER WITH FRIENDS OR RELATIVES?: NOT ASKED

## 2021-06-02 SDOH — ECONOMIC STABILITY: FOOD INSECURITY: WITHIN THE PAST 12 MONTHS, THE FOOD YOU BOUGHT JUST DIDN'T LAST AND YOU DIDN'T HAVE MONEY TO GET MORE.: NEVER TRUE

## 2021-06-02 SDOH — SOCIAL STABILITY: SOCIAL INSECURITY: WITHIN THE LAST YEAR, HAVE YOU BEEN HUMILIATED OR EMOTIONALLY ABUSED IN OTHER WAYS BY YOUR PARTNER OR EX-PARTNER?: NO

## 2021-06-02 SDOH — SOCIAL STABILITY: SOCIAL NETWORK
DO YOU BELONG TO ANY CLUBS OR ORGANIZATIONS SUCH AS CHURCH GROUPS UNIONS, FRATERNAL OR ATHLETIC GROUPS, OR SCHOOL GROUPS?: NOT ASKED

## 2021-06-02 SDOH — SOCIAL STABILITY: SOCIAL INSECURITY
WITHIN THE LAST YEAR, HAVE YOU BEEN KICKED, HIT, SLAPPED, OR OTHERWISE PHYSICALLY HURT BY YOUR PARTNER OR EX-PARTNER?: NO

## 2021-06-02 SDOH — HEALTH STABILITY: MENTAL HEALTH: HOW OFTEN DO YOU HAVE A DRINK CONTAINING ALCOHOL?: 2-3 TIMES A WEEK

## 2021-06-02 SDOH — HEALTH STABILITY: PHYSICAL HEALTH: ON AVERAGE, HOW MANY DAYS PER WEEK DO YOU ENGAGE IN MODERATE TO STRENUOUS EXERCISE (LIKE A BRISK WALK)?: 5 DAYS

## 2021-06-02 SDOH — HEALTH STABILITY: MENTAL HEALTH
STRESS IS WHEN SOMEONE FEELS TENSE, NERVOUS, ANXIOUS, OR CAN'T SLEEP AT NIGHT BECAUSE THEIR MIND IS TROUBLED. HOW STRESSED ARE YOU?: TO SOME EXTENT

## 2021-06-02 SDOH — SOCIAL STABILITY: SOCIAL NETWORK: IN A TYPICAL WEEK, HOW MANY TIMES DO YOU TALK ON THE PHONE WITH FAMILY, FRIENDS, OR NEIGHBORS?: NOT ASKED

## 2021-06-02 SDOH — HEALTH STABILITY: MENTAL HEALTH: HOW MANY STANDARD DRINKS CONTAINING ALCOHOL DO YOU HAVE ON A TYPICAL DAY?: 1 OR 2

## 2021-06-02 SDOH — HEALTH STABILITY: PHYSICAL HEALTH: ON AVERAGE, HOW MANY MINUTES DO YOU ENGAGE IN EXERCISE AT THIS LEVEL?: 60 MIN

## 2021-06-02 SDOH — SOCIAL STABILITY: SOCIAL NETWORK: HOW OFTEN DO YOU ATTEND CHURCH OR RELIGIOUS SERVICES?: NOT ASKED

## 2021-06-02 SDOH — SOCIAL STABILITY: SOCIAL INSECURITY
WITHIN THE LAST YEAR, HAVE TO BEEN RAPED OR FORCED TO HAVE ANY KIND OF SEXUAL ACTIVITY BY YOUR PARTNER OR EX-PARTNER?: NO

## 2021-06-02 SDOH — HEALTH STABILITY: MENTAL HEALTH: HOW OFTEN DO YOU HAVE 6 OR MORE DRINKS ON ONE OCCASION?: NEVER

## 2021-06-02 SDOH — ECONOMIC STABILITY: TRANSPORTATION INSECURITY
IN THE PAST 12 MONTHS, HAS THE LACK OF TRANSPORTATION KEPT YOU FROM MEDICAL APPOINTMENTS OR FROM GETTING MEDICATIONS?: NO

## 2021-06-02 SDOH — ECONOMIC STABILITY: TRANSPORTATION INSECURITY
IN THE PAST 12 MONTHS, HAS LACK OF TRANSPORTATION KEPT YOU FROM MEETINGS, WORK, OR FROM GETTING THINGS NEEDED FOR DAILY LIVING?: NO

## 2021-06-02 SDOH — SOCIAL STABILITY: SOCIAL NETWORK: HOW OFTEN DO YOU ATTENT MEETINGS OF THE CLUB OR ORGANIZATION YOU BELONG TO?: NOT ASKED

## 2021-06-02 SDOH — SOCIAL STABILITY: SOCIAL INSECURITY: WITHIN THE LAST YEAR, HAVE YOU BEEN AFRAID OF YOUR PARTNER OR EX-PARTNER?: NO

## 2021-06-02 ASSESSMENT — MIFFLIN-ST. JEOR: SCORE: 1158.68

## 2021-06-02 NOTE — NURSING NOTE
"56 year old  Chief Complaint   Patient presents with     Physical     left arm soreness, also wants to discuss some eleveted chlesterol and  LDL levels. Nystain refill       Blood pressure 121/81, pulse 59, temperature 97.3  F (36.3  C), resp. rate 12, height 1.624 m (5' 3.94\"), weight 59 kg (130 lb), SpO2 96 %, not currently breastfeeding. Body mass index is 22.36 kg/m .  Patient Active Problem List   Diagnosis     Lentigo maligna (H)     Benign neoplasm of choroid     Postmenopausal status     Chorioretinal scar     Drusen stage macular degeneration       Wt Readings from Last 2 Encounters:   06/02/21 59 kg (130 lb)   09/07/18 64 kg (141 lb)     BP Readings from Last 3 Encounters:   06/02/21 121/81   09/07/18 122/64   08/18/17 116/71         Current Outpatient Medications   Medication     cholecalciferol (D3) 50 MCG (2000 UT) tablet     Cyanocobalamin (B-12 PO)     metroNIDAZOLE (METROCREAM) 0.75 % external cream     nystatin (MYCOSTATIN) 247840 UNIT/GM external ointment     mupirocin (BACTROBAN) 2 % external ointment     tretinoin (RETIN-A) 0.025 % external cream     No current facility-administered medications for this visit.        Social History     Tobacco Use     Smoking status: Never Smoker     Smokeless tobacco: Never Used   Substance Use Topics     Alcohol use: Yes     Comment: 2 drinks a week     Drug use: Never       Health Maintenance Due   Topic Date Due     ADVANCE CARE PLANNING  Never done     HIV SCREENING  Never done     HEPATITIS C SCREENING  Never done     PHQ-2  01/01/2021       Lab Results   Component Value Date    PAP NIL 09/07/2018 June 2, 2021 2:33 PM  "

## 2021-06-03 LAB — HCV AB SERPL QL IA: NONREACTIVE

## 2021-06-04 ENCOUNTER — MYC MEDICAL ADVICE (OUTPATIENT)
Dept: FAMILY MEDICINE | Facility: CLINIC | Age: 56
End: 2021-06-04

## 2021-06-04 DIAGNOSIS — R30.0 DYSURIA: ICD-10-CM

## 2021-06-04 NOTE — TELEPHONE ENCOUNTER
Just saw patient for her physical and this was not mentioned. Will send her a message for clarification. Medication connected to diagnosis of dysuria and I can't find a reason for this.    Pilar Ball PA-C

## 2021-06-04 NOTE — TELEPHONE ENCOUNTER
Last time prescribed: 7/6/2020 , 30 g x 0 refills  Last office visit: 6/2/2021  Next appointment: No future appointments    Routing refill request to provider for review/approval because:  Do you want to continue ordering this medication, and add refills if this is something used chronically?    Sun Aggarwal RN  06/04/21  4:06 PM

## 2021-06-07 RX ORDER — NYSTATIN 100000 U/G
OINTMENT TOPICAL
Qty: 30 G | Refills: 0 | Status: SHIPPED | OUTPATIENT
Start: 2021-06-07 | End: 2022-05-13

## 2021-06-09 ENCOUNTER — MYC MEDICAL ADVICE (OUTPATIENT)
Dept: FAMILY MEDICINE | Facility: CLINIC | Age: 56
End: 2021-06-09

## 2021-06-10 NOTE — TELEPHONE ENCOUNTER
Form completed.  Returned to patient as she will need to sign it before it can be faxed.  See [employer] message.  Pilar Ball PA-C

## 2021-09-19 ENCOUNTER — HEALTH MAINTENANCE LETTER (OUTPATIENT)
Age: 56
End: 2021-09-19

## 2021-09-24 ENCOUNTER — ANCILLARY PROCEDURE (OUTPATIENT)
Dept: MAMMOGRAPHY | Facility: CLINIC | Age: 56
End: 2021-09-24
Attending: PHYSICIAN ASSISTANT
Payer: COMMERCIAL

## 2021-09-24 DIAGNOSIS — Z00.00 ROUTINE GENERAL MEDICAL EXAMINATION AT A HEALTH CARE FACILITY: ICD-10-CM

## 2021-09-24 PROCEDURE — 77067 SCR MAMMO BI INCL CAD: CPT | Mod: GC | Performed by: RADIOLOGY

## 2021-09-24 PROCEDURE — 77063 BREAST TOMOSYNTHESIS BI: CPT | Mod: GC | Performed by: RADIOLOGY

## 2021-11-02 ENCOUNTER — OFFICE VISIT (OUTPATIENT)
Dept: DERMATOLOGY | Facility: CLINIC | Age: 56
End: 2021-11-02
Payer: COMMERCIAL

## 2021-11-02 DIAGNOSIS — L57.8 ACTINIC SKIN DAMAGE: ICD-10-CM

## 2021-11-02 DIAGNOSIS — Z86.006 HISTORY OF MELANOMA IN SITU: Primary | ICD-10-CM

## 2021-11-02 DIAGNOSIS — D22.9 BENIGN NEVUS OF SKIN: ICD-10-CM

## 2021-11-02 DIAGNOSIS — L71.9 ROSACEA: ICD-10-CM

## 2021-11-02 PROCEDURE — 99214 OFFICE O/P EST MOD 30 MIN: CPT | Mod: GC | Performed by: DERMATOLOGY

## 2021-11-02 RX ORDER — TRETINOIN 0.25 MG/G
CREAM TOPICAL
Qty: 45 G | Refills: 11 | Status: SHIPPED | OUTPATIENT
Start: 2021-11-02 | End: 2022-11-22

## 2021-11-02 ASSESSMENT — PAIN SCALES - GENERAL: PAINLEVEL: NO PAIN (0)

## 2021-11-02 NOTE — NURSING NOTE
Dermatology Rooming Note    Jesus Anglin's goals for this visit include:   Chief Complaint   Patient presents with     Skin Check     Annual. No spots of concern today. Personal hx of melanoma in situ     Dilma Casanova, CMA

## 2021-11-02 NOTE — PATIENT INSTRUCTIONS
"Sun Protection    Sunscreen   What does \"broad spectrum mean\"?  Broad spectrum sunscreens protect against both UVA and UVB radiation. UVC is filtered out by the ozone layer.     What does SPF mean?   SPF stands for  Sun Protection Factor  and represents the ability to screen only UVB (burning) rays. UVB rays are mostly blocked in all sunscreens, but only those that contain titanium dioxide, zinc oxide, mexoryl or Parsol 1789 (avobenzone) block the UVA spectrum. Even though a sunscreen is labeled  UVA/UVB Protection  that is not entirely accurate because products that only partially protect against UVA can claim to protect against both UVA and UVB.     What SPF should I chose?   Aim to get a sunscreen that is at least sun protection factor (SPF) 30. SPF 15 provides about 92-93% coverage, SPF 30 about 95-97% coverage, and SPF 45 about 98% coverage. That is to say, SPF 30 is not twice as good as SPF 15. The reason why we recommend SPF 30 is because we are usually only putting on half the necessary amount of sunscreen to achieve the advertised protection. That means that it is very possible that your SPF 30 sunscreen is only providing you with SPF 15 coverage based on how much you are applying. SPF 15 (92-93% coverage) is the absolute minimal that we recommend. Similarly, the benefit of sunscreens with SPF higher than 50 is that even if you put on less than the required amount, you are likely still getting good protection (ex: even if you apply only half the recommended amount of , it should still provide you with an SPF of 50).     How much should I apply?  If covering your whole body, you should be using 30 grams, or one ounce, which is how much is in one shot glass! That s a THICK layer! May times, you are only applying half the recommended amount, which means that you are only getting half the SPF (for example, you may be using SPF 30 but if you're only applying half the recommended amount, you're only " getting SPF 15 protection.      When do I need to wear sunscreen?  Every day, rain or shine! Even on a rainy day or a day when you are only indoors, you are still being exposed harmful UV radiation from the sun. We usually recommend physical/mineral sunscreens (active ingredient is titanium dioxide or zinc oxide) as these ingredients have been around for many years, there is no concern of them being absorbed into the bloodstream, and they are coral reef friendly! However, the best sunscreen is the one that you will use everyday.     What about my kids?  Sunscreen is not recommended for infants under the age of 6 months. Use clothing, shade and sun avoidance for small infants. For kids older than 6 months, we recommend that you should use only mineral/physical sunscreens that have zinc oxide as the active ingredient. Sun-protective clothing and hats are also important for people of all ages.     Sun protective Clothing:  www.coolibar.com  www.sunprecautions.com  www.Bondora (by isePankur).Zyante    Do I need tinted sunscreen?  There is more and more research showing that visible light can also lead to discoloration (such as melasma). Tinted sunscreens (which contain iron oxides) protect against visible light as an added bonus.     What brands do you recommend?    Physical/Mineral Sunscreens (in no particular order)  Elta MD UV Physical Broad-Spectrum SPF 41 Sunscreen (Tinted, $33)  Skin Ceuticals Physical Fusion UV Defense SPF 50 (Tinted, $34)  Unsun Mineral Tinted Face Sunscreen (Tinted, with 2 shade ranges, $29)  It Cosmetics CC+ Cream with SPF 50+ (Tinted, can also double as foundation/coverage -- great range of shades, $40)  Biossance Squalane + Zinc Sheer Mineral Sunscreen SPF 30 PA +++ (goes on white then blends in, $30)  Cerave 100% Mineral Sunscreen SPF 50 Face (good for sensitive skin, $15)  La Roche Posay Anthelios Mineral Zinc Oxide Sunscreen SPF 50 ($35)  La Roche Posay Anthelios Mineral Tinted Sunscreen for Face SPF 50  "($35)  Think Sport Sunscreen (great for sports, though has more of a white cast, $20)  Think Baby Sunscreen (for kids, $21)  Color Science Sunforgettable Total Protection Brush On Shield SPF 50 (Multiple tints, $130)    Chemical Sunscreens  Nevaehrosita Krauseau Weightless Protection SPF 30 ($48)  Madison SPF Brightening Moisturizer ($30)  Urban Skin Complexion Protection Moisturizer SPF 30 ($20)  Total Defense + Repair Broad Spectrum SPF 34 ($68)  Clarins UV PLUS Anti-Pollution Sunscreen Multi-Protection Tint SPF 50 (Multiple tints, $45)  Neutrogena Healthy Skin Glow Sheers Tinted Moisturizer with SPF 20 (Multiple tints, $11)    The ABCDEs of Melanoma  Skin cancer can develop anywhere on the skin. Once a month, take a look at your entire body and note any changing moles or spots. Ask someone for help when checking your skin, especially for hard to see places such as your back. If you notice a mole that looks different from others, or one that changes, enlarges, itches, or bleeds, you should see a dermatologist.    Asymmetry, Border (irregularity), Color (not uniform, changes in color), Diameter (greater than 6 mm which is about the size of a pencil eraser), and Evolving (any changes in pre-existing moles). In short, look for the \"ugly duckling.\" You want all of the spots on your body to look like cousins (like they could be related). If something stands out, take a photo of it and make an appointment to have it evaluated.     Suggested supplement:   - Heliocare - claims to maintain the skin's ability to protect itself against sun-related effects and aging.   - Not a replacement for sunscreen! Should be used in addition to sunscreen and sun protective measures such as hats, etc.  - Usually available online (Amazon) and at major retailers such as [employer], etc.          "

## 2021-11-02 NOTE — PROGRESS NOTES
[facility] Dermatology Note      Dermatology Problem List:  # Lentigo maligna, left nasal ala  - Mohs excision 8/2010 with reconstruction by plastics and surface treatments by Dr. Martinez in the year after surgery.  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 History of Lentigo maligna, left nasal ala  No evidence of recurrence. ROS reassuring.   - ABCDs of melanoma were discussed and self skin checks were advised.   - Sun precaution was advised including the use of sun screens of SPF 30 or higher, sun protective clothing, and avoidance of tanning beds.  - Heliocare handout provided   - Patient considering following up with Dr. Martinez for scar cosmesis     2. Clinically benign nevus on the R plantar foot  No concerning changes noted today.  - We will clinically monitor this area.    3. Acne vulgaris  Patient purchases tretinoin with paper Rx and GoodRx coupon.   - continue tretinoin 0.025% cream nightly, new Rx provided today     4. Rosacea  Very mild and well controlled.   - continue metronidazole 0.75% cream daily as needed      Follow-up in 1 year, earlier for new or changing lesions.     Dr. Quinn staffed the patient.    Staff Involved:  Maria Guadalupe Hurtado MD ([id number])/Staff  I, Anna Quinn MD, saw this patient with the resident and agree with the resident s findings and plan of care as documented in the resident s note.      Encounter Date: Nov 2, 2021    CC:   Chief Complaint   Patient presents with     Skin Check     Annual. No spots of concern today. Personal hx of melanoma in situ     History of Present Illness:  Ms. Jesus Anglin is a very pleasant 56 year old female who  presents as a follow-up for skin check. History of LMM of the L nasal ala s/p Mohs excision 2010.  Today, patient reports feeling well.  Denies any new, changing, tender, bleeding, nonhealing, or otherwise concerning lesions on the skin.  Denies any fevers, chills, unintended weight loss, or night sweats.  Wears of protection diligently.  Would like a new prescription for tretinoin for facial acne, uses good Rx coupons to purchase this as it is not covered by insurance. No other concerns today.     Past Medical History:   Patient Active Problem List   Diagnosis     Lentigo maligna (H)     Benign neoplasm of choroid     Postmenopausal status     Nevus of choroid of right eye     Drusen stage macular degeneration     Past Medical History:   Diagnosis Date     Lentigo maligna (H)      Past Surgical History:   Procedure Laterality Date      SECTION       MOHS MICROGRAPHIC PROCEDURE       REPAIR MOHS       Social History:  Patient reports that she has never smoked. She has never used smokeless tobacco. She reports current alcohol use of about 2.0 standard drinks of alcohol per week. She reports that she does not use drugs.    Family History:  Family History   Problem Relation Age of Onset     Lipids Mother      Hypertension Mother      Skin Cancer Mother         BCC and SCC     Diabetes Father      Hypertension Father      Hyperlipidemia Father      C.A.D. Maternal Grandfather         MI age 52     Cerebrovascular Disease Maternal Grandmother      Cerebrovascular Disease Paternal Grandmother      Melanoma Sister      Diabetes Sister      Medications:  Current Outpatient Medications   Medication Sig Dispense Refill     cholecalciferol (D3) 50 MCG (2000) tablet        Cyanocobalamin (B-12 PO)        metroNIDAZOLE (METROCREAM) 0.75 % external cream Apply to face 1-2 times a day. 45 g 11     nystatin (MYCOSTATIN) 531612 UNIT/GM external ointment Apply topically 2 times daily as needed to affected area 30 g 0      tretinoin (RETIN-A) 0.025 % external cream Apply to face every night 45 g 11     mupirocin (BACTROBAN) 2 % external ointment Use 2 times a day to affected area. (Patient not taking: Reported on 11/2/2020) 30 g 1      Allergies   Allergen Reactions     Penicillin G Hives     Benzoyl Peroxide Swelling and Rash     Review of Systems:  -Constitutional: Denies fevers, chills, night sweats, or unintended weight loss.  -Constitutional: Otherwise feeling well today, in usual state of health.  -Skin: As above in HPI. No additional skin concerns.    Physical exam:  GEN: This is a well developed, well-nourished female in no acute distress, in a pleasant mood.    SKIN: Full skin, which includes the head/face, both arms, chest, back, abdomen,both legs, genitalia and/or groin buttocks, digits and/or nails, was examined.  -Nance skin type: I-II  -L nasal ala with well healed surgical scar  -R plantar foot with ~5 mm brown macule with pigment in the furrows  -There are dome shaped bright red papules on the trunk and extremities.   -No other lesions of concern on areas examined.

## 2021-11-02 NOTE — NURSING NOTE
Chief Complaint   Patient presents with     Skin Check     Annual     Pt is here for an annual skin exam.

## 2021-11-02 NOTE — LETTER
11/2/2021       RE: Jesus Anglin  [address] [address]     Dear Colleague,    Thank you for referring your patient, Jesus Anglin, to the [facility] DERMATOLOGY CLINIC [place] at [facility]. Please see a copy of my visit note below.    [facility] Dermatology Note      Dermatology Problem List:  # Lentigo maligna, left nasal ala  - Mohs excision 8/2010 with reconstruction by plastics and surface treatments by Dr. Martinez in the year after surgery.  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 History of Lentigo maligna, left nasal ala  No evidence of recurrence. ROS reassuring.   - ABCDs of melanoma were discussed and self skin checks were advised.   - Sun precaution was advised including the use of sun screens of SPF 30 or higher, sun protective clothing, and avoidance of tanning beds.  - [employer] handout provided   - Patient considering following up with Dr. Martinez for scar cosmesis     2. Clinically benign nevus on the R plantar foot  No concerning changes noted today.  - We will clinically monitor this area.    3. Acne vulgaris  Patient purchases tretinoin with paper Rx and GoodRx coupon.   - continue tretinoin 0.025% cream nightly, new Rx provided today     4. Rosacea  Very mild and well controlled.   - continue metronidazole 0.75% cream daily as needed      Follow-up in 1 year, earlier for new or changing lesions.     Dr. Quinn staffed the patient.    Staff Involved:  Maria Guadalupe Hurtado MD ([email or web address])/Staff  I, Anna Quinn MD, saw this patient with the resident and agree with the resident s  findings and plan of care as documented in the resident s note.      Encounter Date: 2021    CC:   Chief Complaint   Patient presents with     Skin Check     Annual. No spots of concern today. Personal hx of melanoma in situ     History of Present Illness:  Ms. Jesus Anglin is a very pleasant 56 year old female who presents as a follow-up for skin check. History of LMM of the L nasal ala s/p Mohs excision 2010.  Today, patient reports feeling well.  Denies any new, changing, tender, bleeding, nonhealing, or otherwise concerning lesions on the skin.  Denies any fevers, chills, unintended weight loss, or night sweats.  Wears of protection diligently.  Would like a new prescription for tretinoin for facial acne, uses good Rx coupons to purchase this as it is not covered by insurance. No other concerns today.     Past Medical History:   Patient Active Problem List   Diagnosis     Lentigo maligna (H)     Benign neoplasm of choroid     Postmenopausal status     Nevus of choroid of right eye     Drusen stage macular degeneration     Past Medical History:   Diagnosis Date     Lentigo maligna (H)      Past Surgical History:   Procedure Laterality Date      SECTION       MOHS MICROGRAPHIC PROCEDURE       REPAIR MOHS       Social History:  Patient reports that she has never smoked. She has never used smokeless tobacco. She reports current alcohol use of about 2.0 standard drinks of alcohol per week. She reports that she does not use drugs.    Family History:  Family History   Problem Relation Age of Onset     Lipids Mother      Hypertension Mother      Skin Cancer Mother         BCC and SCC     Diabetes Father      Hypertension Father      Hyperlipidemia Father      C.A.D. Maternal Grandfather         MI age 52     Cerebrovascular Disease Maternal Grandmother      Cerebrovascular Disease Paternal Grandmother      Melanoma Sister      Diabetes Sister      Medications:  Current Outpatient Medications    Medication Sig Dispense Refill     cholecalciferol (D3) 50 MCG (2000 UT) tablet        Cyanocobalamin (B-12 PO)        metroNIDAZOLE (METROCREAM) 0.75 % external cream Apply to face 1-2 times a day. 45 g 11     nystatin (MYCOSTATIN) 839296 UNIT/GM external ointment Apply topically 2 times daily as needed to affected area 30 g 0     tretinoin (RETIN-A) 0.025 % external cream Apply to face every night 45 g 11     mupirocin (BACTROBAN) 2 % external ointment Use 2 times a day to affected area. (Patient not taking: Reported on 11/2/2020) 30 g 1      Allergies   Allergen Reactions     Penicillin G Hives     Benzoyl Peroxide Swelling and Rash     Review of Systems:  -Constitutional: Denies fevers, chills, night sweats, or unintended weight loss.  -Constitutional: Otherwise feeling well today, in usual state of health.  -Skin: As above in HPI. No additional skin concerns.    Physical exam:  GEN: This is a well developed, well-nourished female in no acute distress, in a pleasant mood.    SKIN: Full skin, which includes the head/face, both arms, chest, back, abdomen,both legs, genitalia and/or groin buttocks, digits and/or nails, was examined.  -Nance skin type: I-II  -L nasal ala with well healed surgical scar  -R plantar foot with ~5 mm brown macule with pigment in the furrows  -There are dome shaped bright red papules on the trunk and extremities.   -No other lesions of concern on areas examined.

## 2021-11-24 ENCOUNTER — IMMUNIZATION (OUTPATIENT)
Dept: NURSING | Facility: CLINIC | Age: 56
End: 2021-11-24
Payer: COMMERCIAL

## 2021-11-24 PROCEDURE — 91306 COVID-19,PF,MODERNA (18+ YRS BOOSTER .25ML): CPT

## 2021-11-24 PROCEDURE — 0064A COVID-19,PF,MODERNA (18+ YRS BOOSTER .25ML): CPT

## 2022-01-22 ENCOUNTER — MYC MEDICAL ADVICE (OUTPATIENT)
Dept: FAMILY MEDICINE | Facility: CLINIC | Age: 57
End: 2022-01-22
Payer: COMMERCIAL

## 2022-05-12 DIAGNOSIS — R30.0 DYSURIA: ICD-10-CM

## 2022-05-12 NOTE — TELEPHONE ENCOUNTER
Last office visit was on 06/02/2021, no future visits scheduled.  Medication last refilled on 06/07/2021 with 30g and 0 refills.

## 2022-05-13 RX ORDER — NYSTATIN 100000 U/G
OINTMENT TOPICAL
Qty: 30 G | Refills: 0 | Status: SHIPPED | OUTPATIENT
Start: 2022-05-13 | End: 2023-07-20

## 2022-05-13 NOTE — TELEPHONE ENCOUNTER
Medication requested: nystatin (MYCOSTATIN) 126861 UNIT/GM external ointment  Last office visit: 6/2/21  Future [facility] appointments: none  Medication last refilled: 6/7/21  Last qualifying labs: N/A    Medication is being filled for 1 time refill only due to:  pt will be due for physical in June and needs to establish care with new PCP Dr. Cornelius Gordon message sent to pt to have her schedule physical in June.    Douglas BECKMAN, RN  05/13/22 12:34 PM

## 2022-06-04 ENCOUNTER — PATIENT OUTREACH (OUTPATIENT)
Dept: DERMATOLOGY | Facility: CLINIC | Age: 57
End: 2022-06-04
Payer: COMMERCIAL

## 2022-06-04 NOTE — CONFIDENTIAL NOTE
Attempted to reach patient to schedule follow up in the Dermatology Clinic.  No answer,  LM on VM to call office and [employer] message sent.    Schedule with Dr. Anna Quinn - rtn- Nov- annual.

## 2022-07-04 ASSESSMENT — ENCOUNTER SYMPTOMS: BREAST MASS: 0

## 2022-07-07 ENCOUNTER — OFFICE VISIT (OUTPATIENT)
Dept: FAMILY MEDICINE | Facility: CLINIC | Age: 57
End: 2022-07-07
Payer: COMMERCIAL

## 2022-07-07 VITALS
DIASTOLIC BLOOD PRESSURE: 70 MMHG | HEIGHT: 64 IN | RESPIRATION RATE: 16 BRPM | BODY MASS INDEX: 22.66 KG/M2 | OXYGEN SATURATION: 97 % | TEMPERATURE: 97 F | HEART RATE: 57 BPM | SYSTOLIC BLOOD PRESSURE: 129 MMHG | WEIGHT: 132.75 LBS

## 2022-07-07 DIAGNOSIS — Z00.00 ROUTINE GENERAL MEDICAL EXAMINATION AT A HEALTH CARE FACILITY: Primary | ICD-10-CM

## 2022-07-07 DIAGNOSIS — Z13.220 SCREENING FOR LIPID DISORDERS: ICD-10-CM

## 2022-07-07 DIAGNOSIS — D03.9 LENTIGO MALIGNA (H): ICD-10-CM

## 2022-07-07 DIAGNOSIS — Z13.1 SCREENING FOR DIABETES MELLITUS: ICD-10-CM

## 2022-07-07 PROBLEM — L71.9 ROSACEA: Status: ACTIVE | Noted: 2022-07-07

## 2022-07-07 PROBLEM — Z78.0 POSTMENOPAUSAL STATUS: Status: RESOLVED | Noted: 2017-08-18 | Resolved: 2022-07-07

## 2022-07-07 NOTE — PROGRESS NOTES
"CC: Physical (57 yrs old )      Jesus Anglin is a 57 year old female who presents to clinic for an annual exam.       Chronic health conditions:  Patient Active Problem List   Diagnosis     Lentigo maligna (H) - sees dermatology annually      Benign neoplasm of choroid - sees ophthalmology at [facility]     Postmenopausal status     Nevus of choroid of right eye     Drusen stage macular degeneration     Rosacea     We reviewed and updated her medication list as necessary. Her past medical and surgical history as well as her family history were reviewed and updated as necessary.    Gynecological history:  Menopausal symptoms: postmenopausal no bleeding   Last Pap:  9/2018, result normal with negative HPV  History of abnormal Paps: no  Concern for STIs: no  Breast cancer screening: mammogram in 9/2021 - BIRADS1    Other health-related habits:  Tobacco: None    Alcohol: Occassional  Drug use: no   DM screening: glucose in 6/2021 - normal  Lipids: lipids in 6/2021 - , HDL 53, , VLDL 36 (elevated)  Hep C screening: negative in 6/2021  Colon cancer screening: colonoscopy in 8/2014 - follow-up 10 years   Vaccines: UTD    Answers for HPI/ROS submitted by the patient on 7/4/2022  Frequency of exercise:: 6-7 days/week - yoga, HIIT classes, [employer]   Getting at least 3 servings of Calcium per day:: Yes  Diet:: Vegetarian/vegan  Taking medications regularly:: Yes  Medication side effects:: Not applicable  Bi-annual eye exam:: Yes  Dental care twice a year:: Yes  Sleep apnea or symptoms of sleep apnea:: None  pelvic pain: No  vaginal bleeding: No  vaginal discharge: No  tenderness: No  breast mass: No  breast discharge: No  Additional concerns today:: No  Duration of exercise:: 30-45 minutes        OBJECTIVE:   /70 (BP Location: Right arm, Patient Position: Sitting, Cuff Size: Adult Regular)   Pulse 57   Temp 97  F (36.1  C) (Temporal)   Resp 16   Ht 1.626 m (5' 4\")   Wt 60.2 kg (132 lb 12 oz)   LMP  (LMP " Unknown)   SpO2 97%   BMI 22.79 kg/m     General: Healthy female in NAD.  Cooperative and pleasant.  HEENT: Normocephalic, atraumatic. TMs normal. Supple neck, without lymphadenopathy or thyromegaly.    Breasts: No obvious masses, axillary adenopathy or fibrocystic changes.    Lungs: CTA bilaterally.  CV: RRR, normal S1 and S2, without murmurs, rubs, or gallops appreciated.    Abdomen: Soft, NT, ND.  No masses or hepatosplenomegaly appreciated.    Extremities: WWW, without deformity, edema.  Skin: Clear without lesions or rashes.   Neuro: Grossly intact, nonfocal.  Psych: Mood and affect appropriate.    ASSESSMENT AND PLAN:   Jesus was seen today for physical.  Routine general medical examination at a health care facility  Fasting lipid & glucose ordered to be done at future date.   Mammogram in the fall.   Pap in 2023.   Colonoscopy in 2024.   Continue follow-up with dermatology for lentigo maligna.   Continue healthy lifestyle habits.     Return in about 1 year (around 7/7/2023) for physical.    Dot Clayton MD  Family Medicine

## 2022-07-07 NOTE — NURSING NOTE
"57 year old  Chief Complaint   Patient presents with     Physical     57 yrs old        Blood pressure 129/70, pulse 57, temperature 97  F (36.1  C), temperature source Temporal, resp. rate 16, height 1.626 m (5' 4\"), weight 60.2 kg (132 lb 12 oz), SpO2 97 %, not currently breastfeeding. Body mass index is 22.79 kg/m .  Patient Active Problem List   Diagnosis     Lentigo maligna (H)     Benign neoplasm of choroid     Postmenopausal status     Nevus of choroid of right eye     Drusen stage macular degeneration       Wt Readings from Last 2 Encounters:   07/07/22 60.2 kg (132 lb 12 oz)   06/02/21 59 kg (130 lb)     BP Readings from Last 3 Encounters:   07/07/22 129/70   06/02/21 121/81   09/07/18 122/64         Current Outpatient Medications   Medication     cholecalciferol (D3) 50 MCG (2000 UT) tablet     Cyanocobalamin (B-12 PO)     metroNIDAZOLE (METROCREAM) 0.75 % external cream     Multiple Vitamins-Minerals (MULTI COMPLETE PO)     nystatin (MYCOSTATIN) 950058 UNIT/GM external ointment     tretinoin (RETIN-A) 0.025 % external cream     No current facility-administered medications for this visit.       Social History     Tobacco Use     Smoking status: Never Smoker     Smokeless tobacco: Never Used   Substance Use Topics     Alcohol use: Yes     Alcohol/week: 2.0 standard drinks     Types: 2 Standard drinks or equivalent per week     Drug use: Never       Health Maintenance Due   Topic Date Due     ADVANCE CARE PLANNING  Never done     PREVENTIVE CARE VISIT  06/02/2022       Lab Results   Component Value Date    PAP NIL 09/07/2018 July 7, 2022 2:21 PM  "

## 2022-07-19 ENCOUNTER — TRANSFERRED RECORDS (OUTPATIENT)
Dept: FAMILY MEDICINE | Facility: CLINIC | Age: 57
End: 2022-07-19

## 2022-07-19 LAB
CHOLESTEROL (EXTERNAL): 242 MG/DL
GLUCOSE (EXTERNAL): 88 MG/DL (ref 65–99)
HDLC SERPL-MCNC: 47 MG/DL
LDL CHOLESTEROL (EXTERNAL): 164 MG/DL
NON HDL CHOLESTEROL (EXTERNAL): 195 MG/DL
TRIGLYCERIDES (EXTERNAL): 166 MG/DL

## 2022-07-27 ENCOUNTER — MYC MEDICAL ADVICE (OUTPATIENT)
Dept: FAMILY MEDICINE | Facility: CLINIC | Age: 57
End: 2022-07-27

## 2022-10-07 ENCOUNTER — ANCILLARY PROCEDURE (OUTPATIENT)
Dept: MAMMOGRAPHY | Facility: CLINIC | Age: 57
End: 2022-10-07
Attending: FAMILY MEDICINE
Payer: COMMERCIAL

## 2022-10-07 DIAGNOSIS — Z12.31 VISIT FOR SCREENING MAMMOGRAM: ICD-10-CM

## 2022-10-07 PROCEDURE — 77063 BREAST TOMOSYNTHESIS BI: CPT | Mod: GC

## 2022-10-07 PROCEDURE — 77067 SCR MAMMO BI INCL CAD: CPT | Mod: GC

## 2022-11-22 ENCOUNTER — OFFICE VISIT (OUTPATIENT)
Dept: DERMATOLOGY | Facility: CLINIC | Age: 57
End: 2022-11-22
Payer: COMMERCIAL

## 2022-11-22 DIAGNOSIS — L71.9 ROSACEA: ICD-10-CM

## 2022-11-22 DIAGNOSIS — Z85.820 HISTORY OF MELANOMA: Primary | ICD-10-CM

## 2022-11-22 DIAGNOSIS — L57.8 ACTINIC SKIN DAMAGE: ICD-10-CM

## 2022-11-22 PROCEDURE — 99213 OFFICE O/P EST LOW 20 MIN: CPT | Mod: GC | Performed by: DERMATOLOGY

## 2022-11-22 RX ORDER — TRETINOIN 0.25 MG/G
CREAM TOPICAL
Qty: 45 G | Refills: 11 | Status: SHIPPED | OUTPATIENT
Start: 2022-11-22 | End: 2023-11-28

## 2022-11-22 ASSESSMENT — PAIN SCALES - GENERAL: PAINLEVEL: NO PAIN (0)

## 2022-11-22 NOTE — LETTER
11/22/2022       RE: Jesus Anglin  [address] [address]     Dear Colleague,    Thank you for referring your patient, Jesus Anglin, to the [facility] DERMATOLOGY CLINIC [place] at [facility]. Please see a copy of my visit note below.    [facility] Dermatology Note  Encounter Date: Nov 22, 2022  Office Visit     Dermatology Problem List:  # Lentigo maligna, left nasal ala  - Mohs excision 8/2010 with reconstruction by plastics and surface treatments by Dr. Martinez in the year after surgery.  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 History of Lentigo maligna, left nasal ala  No evidence of recurrence by review of systems or exam      - ABCDs of melanoma were discussed and self skin checks were advised.   - Sun precaution was advised including the use of sun screens of SPF 30 or higher, sun protective clothing, and avoidance of tanning beds.      2. Clinically benign nevus on the R plantar foot  No concerning changes noted today.  - We will clinically monitor this area.     3. Benign nevi, seborrheic keratoses, and cherry angiomas    - Reassurance provided     4. Rosacea  Very mild and well controlled.     - continue tretinoin 0.025 % cream daily as needed (refill provided)      Follow-up in 1 year, earlier for new or changing lesions.     Procedures Performed:   None    Follow-up: one year    Staff and Resident:     MD Dr. Kai Jasso, Anna Quinn MD, saw this patient with the resident and agree with the resident s findings and plan of care as  documented in the resident s note.    ____________________________________________    CC: Skin Check (No areas of concern)    HPI:  Ms. Jesus Anglin is a(n) 57 year old female who presents today as a return patient for FBSE. She has not noted any new lesions today. She has not noticed any of her moles changing or growing.     Patient is otherwise feeling well, without additional skin concerns.    Labs Reviewed:  N/A    Physical Exam:  Vitals: LMP  (LMP Unknown)     SKIN: Full skin, which includes the head/face, both arms, chest, back, abdomen,both legs, genitalia and/or groin buttocks, digits and/or nails, was examined.    -L nasal ala with well healed surgical scar  -R plantar foot with ~5 mm brown macule with pigment in the furrows  -There are dome shaped bright red papules on the trunk and extremities.   - There are waxy stuck on tan to brown papules on the trunk and extremities.   - No other lesions of concern on areas examined.     Medications:  Current Outpatient Medications   Medication     cholecalciferol (D3) 50 MCG (2000 UT) tablet     Cyanocobalamin (B-12 PO)     metroNIDAZOLE (METROCREAM) 0.75 % external cream     Multiple Vitamins-Minerals (MULTI COMPLETE PO)     nystatin (MYCOSTATIN) 705177 UNIT/GM external ointment     tretinoin (RETIN-A) 0.025 % external cream     No current facility-administered medications for this visit.      Past Medical History:   Patient Active Problem List   Diagnosis     Lentigo maligna (H)     Benign neoplasm of choroid     Nevus of choroid of right eye     Drusen stage macular degeneration     Rosacea     Past Medical History:   Diagnosis Date     Benign neoplasm of choroid 09/24/2012     Drusen stage macular degeneration 12/06/2018     Lentigo maligna (H)      Mixed hyperlipidemia      Nevus of choroid of right eye 12/06/2018     Rosacea 07/07/2022       CC Referred Self, MD  No address on file on close of this encounter.

## 2022-11-22 NOTE — NURSING NOTE
Chief Complaint   Patient presents with     Skin Check     No areas of concern     Maciej Toth, EMT

## 2022-11-22 NOTE — PATIENT INSTRUCTIONS
Checking for Skin Cancer  You can find cancer early by checking your skin each month. There are 3 kinds of skin cancer. They are melanoma, basal cell carcinoma, and squamous cell carcinoma. Doing monthly skin checks is the best way to find new marks or skin changes. Follow the instructions below for checking your skin.   The ABCDEs of checking moles for melanoma   Check your moles or growths for signs of melanoma using ABCDE:   Asymmetry: the sides of the mole or growth don t match  Border: the edges are ragged, notched, or blurred  Color: the color within the mole or growth varies  Diameter: the mole or growth is larger than 6 mm (size of a pencil eraser)  Evolving: the size, shape, or color of the mole or growth is changing (evolving is not shown in the images below)    Checking for other types of skin cancer  Basal cell carcinoma or squamous cell carcinoma have symptoms such as:     A spot or mole that looks different from all other marks on your skin  Changes in how an area feels, such as itching, tenderness, or pain  Changes in the skin's surface, such as oozing, bleeding, or scaliness  A sore that does not heal  New swelling or redness beyond the border of a mole    Who s at risk?  Anyone can get skin cancer. But you are at greater risk if you have:   Fair skin, light-colored hair, or light-colored eyes  Many moles or abnormal moles on your skin  A history of sunburns from sunlight or tanning beds  A family history of skin cancer  A history of exposure to radiation or chemicals  A weakened immune system  If you have had skin cancer in the past, you are at risk for recurring skin cancer.   How to check your skin  Do your monthly skin checkups in front of a full-length mirror. Check all parts of your body, including your:   Head (ears, face, neck, and scalp)  Torso (front, back, and sides)  Arms (tops, undersides, upper, and lower armpits)  Hands (palms, backs, and fingers, including under the nails)  Buttocks  and genitals  Legs (front, back, and sides)  Feet (tops, soles, toes, including under the nails, and between toes)  If you have a lot of moles, take digital photos of them each month. Make sure to take photos both up close and from a distance. These can help you see if any moles change over time.   Most skin changes are not cancer. But if you see any changes in your skin, call your doctor right away. Only he or she can diagnose a problem. If you have skin cancer, seeing your doctor can be the first step toward getting the treatment that could save your life.     [employer] Law last reviewed this educational content on 4/1/2019 2000-2020 [employer]. [address] [place] [address], [place] [address]. All rights reserved. This information is not intended as a substitute for professional medical care. Always follow your healthcare professional's instructions.    When should I call my doctor?  If you are worsening or not improving, please, contact us or seek urgent care as noted below.     Who should I call with questions (adults)?  [facility] (adult and pediatric): [phone]  [facility] (adult): [phone]  For urgent needs outside of business hours call the [facility] at [phone] and ask for the dermatology resident on call to be paged  If this is a medical emergency and you are unable to reach an ER, Call [phone]

## 2022-11-22 NOTE — PROGRESS NOTES
[facility] Dermatology Note  Encounter Date: Nov 22, 2022  Office Visit     Dermatology Problem List:  # Lentigo maligna, left nasal ala  - Mohs excision 8/2010 with reconstruction by plastics and surface treatments by Dr. Martinez in the year after surgery.  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 History of Lentigo maligna, left nasal ala  No evidence of recurrence by review of systems or exam      - ABCDs of melanoma were discussed and self skin checks were advised.   - Sun precaution was advised including the use of sun screens of SPF 30 or higher, sun protective clothing, and avoidance of tanning beds.      2. Clinically benign nevus on the R plantar foot  No concerning changes noted today.  - We will clinically monitor this area.     3. Benign nevi, seborrheic keratoses, and cherry angiomas    - Reassurance provided     4. Rosacea  Very mild and well controlled.     - continue tretinoin 0.025 % cream daily as needed (refill provided)      Follow-up in 1 year, earlier for new or changing lesions.     Procedures Performed:   None    Follow-up: one year    Staff and Resident:     MD Dr. Kai Jasso, Anna Quinn MD, saw this patient with the resident and agree with the resident s findings and plan of care as documented in the resident s note.    ____________________________________________    CC: Skin Check (No areas of concern)    HPI:  Ms. Jesus Anglin is a(n) 57 year old female who presents today as a return patient for FBSE. She has not noted any new lesions today. She has not noticed any of her moles changing or growing.      Patient is otherwise feeling well, without additional skin concerns.    Labs Reviewed:  N/A    Physical Exam:  Vitals: LMP  (LMP Unknown)     SKIN: Full skin, which includes the head/face, both arms, chest, back, abdomen,both legs, genitalia and/or groin buttocks, digits and/or nails, was examined.    -L nasal ala with well healed surgical scar  -R plantar foot with ~5 mm brown macule with pigment in the furrows  -There are dome shaped bright red papules on the trunk and extremities.   - There are waxy stuck on tan to brown papules on the trunk and extremities.   - No other lesions of concern on areas examined.     Medications:  Current Outpatient Medications   Medication     cholecalciferol (D3) 50 MCG (2000 UT) tablet     Cyanocobalamin (B-12 PO)     metroNIDAZOLE (METROCREAM) 0.75 % external cream     Multiple Vitamins-Minerals (MULTI COMPLETE PO)     nystatin (MYCOSTATIN) 667048 UNIT/GM external ointment     tretinoin (RETIN-A) 0.025 % external cream     No current facility-administered medications for this visit.      Past Medical History:   Patient Active Problem List   Diagnosis     Lentigo maligna (H)     Benign neoplasm of choroid     Nevus of choroid of right eye     Drusen stage macular degeneration     Rosacea     Past Medical History:   Diagnosis Date     Benign neoplasm of choroid 09/24/2012     Drusen stage macular degeneration 12/06/2018     Lentigo maligna (H)      Mixed hyperlipidemia      Nevus of choroid of right eye 12/06/2018     Rosacea 07/07/2022       CC Referred Self, MD  No address on file on close of this encounter.

## 2023-07-13 ASSESSMENT — ENCOUNTER SYMPTOMS
EYE PAIN: 0
NAUSEA: 0
NERVOUS/ANXIOUS: 0
CHILLS: 0
ABDOMINAL PAIN: 0
ARTHRALGIAS: 0
HEMATURIA: 0
HEARTBURN: 0
HEMATOCHEZIA: 0
FREQUENCY: 0
DIZZINESS: 0
FEVER: 0
MYALGIAS: 0
SORE THROAT: 0
SHORTNESS OF BREATH: 0
PARESTHESIAS: 0
WEAKNESS: 0
PALPITATIONS: 0
JOINT SWELLING: 0
DIARRHEA: 0
DYSURIA: 0
CONSTIPATION: 0
HEADACHES: 0
BREAST MASS: 0
COUGH: 0

## 2023-07-19 NOTE — PATIENT INSTRUCTIONS
Bone density -- call to schedule   [address] [facility]  [phone]    Preventive Health Recommendations  Female Ages 50 - 64    Yearly exam: See your health care provider every year in order to  Review health changes.   Discuss preventive care.    Review your medicines if your doctor has prescribed any.    Get a Pap test every three years (unless you have an abnormal result and your provider advises testing more often).  If you get Pap tests with HPV test, you only need to test every 5 years, unless you have an abnormal result.   You do not need a Pap test if your uterus was removed (hysterectomy) and you have not had cancer.  You should be tested each year for STDs (sexually transmitted diseases) if you're at risk.   Have a mammogram every 1 to 2 years.  Have a colonoscopy at age 50, or have a yearly FIT test (stool test). These exams screen for colon cancer.    Have a cholesterol test every 5 years, or more often if advised.  Have a diabetes test (fasting glucose) every three years. If you are at risk for diabetes, you should have this test more often.   If you are at risk for osteoporosis (brittle bone disease), think about having a bone density scan (DEXA).    Shots: Get a flu shot each year. Get a tetanus shot every 10 years.    Nutrition:   Eat at least 5 servings of fruits and vegetables each day.  Eat whole-grain bread, whole-wheat pasta and brown rice instead of white grains and rice.  Get adequate Calcium and Vitamin D.     Lifestyle  Exercise at least 150 minutes a week (30 minutes a day, 5 days a week). This will help you control your weight and prevent disease.  Limit alcohol to one drink per day.  No smoking.   Wear sunscreen to prevent skin cancer.   See your dentist every six months for an exam and cleaning.  See your eye doctor every 1 to 2 years.

## 2023-07-20 ENCOUNTER — OFFICE VISIT (OUTPATIENT)
Dept: FAMILY MEDICINE | Facility: CLINIC | Age: 58
End: 2023-07-20
Payer: COMMERCIAL

## 2023-07-20 VITALS
BODY MASS INDEX: 21.34 KG/M2 | SYSTOLIC BLOOD PRESSURE: 131 MMHG | HEART RATE: 97 BPM | TEMPERATURE: 97.7 F | OXYGEN SATURATION: 94 % | DIASTOLIC BLOOD PRESSURE: 88 MMHG | WEIGHT: 125 LBS | HEIGHT: 64 IN

## 2023-07-20 DIAGNOSIS — M21.612 BILATERAL BUNIONS: ICD-10-CM

## 2023-07-20 DIAGNOSIS — B07.0 PLANTAR WARTS: ICD-10-CM

## 2023-07-20 DIAGNOSIS — Z00.00 ROUTINE GENERAL MEDICAL EXAMINATION AT A HEALTH CARE FACILITY: Primary | ICD-10-CM

## 2023-07-20 DIAGNOSIS — Z12.4 SCREENING FOR CERVICAL CANCER: ICD-10-CM

## 2023-07-20 DIAGNOSIS — R23.8 SKIN IRRITATION: ICD-10-CM

## 2023-07-20 DIAGNOSIS — M21.611 BILATERAL BUNIONS: ICD-10-CM

## 2023-07-20 DIAGNOSIS — Z78.0 POSTMENOPAUSAL STATUS: ICD-10-CM

## 2023-07-20 DIAGNOSIS — Z13.220 SCREENING FOR LIPID DISORDERS: ICD-10-CM

## 2023-07-20 PROCEDURE — 87624 HPV HI-RISK TYP POOLED RSLT: CPT | Mod: ORL | Performed by: FAMILY MEDICINE

## 2023-07-20 PROCEDURE — G0145 SCR C/V CYTO,THINLAYER,RESCR: HCPCS | Mod: ORL | Performed by: FAMILY MEDICINE

## 2023-07-20 PROCEDURE — 80061 LIPID PANEL: CPT | Mod: ORL | Performed by: FAMILY MEDICINE

## 2023-07-20 PROCEDURE — 80048 BASIC METABOLIC PNL TOTAL CA: CPT | Mod: ORL | Performed by: FAMILY MEDICINE

## 2023-07-20 RX ORDER — NYSTATIN 100000 U/G
OINTMENT TOPICAL
Qty: 30 G | Refills: 0 | Status: SHIPPED | OUTPATIENT
Start: 2023-07-20

## 2023-07-20 NOTE — PROGRESS NOTES
"CC: Physical (Nystatin refill. Planters wart on bottom of both feet. Had some pain in lower left side, started a few days ago. )      Jesus Anglin is a 58 year old female who presents to clinic for an annual exam.       Chronic health conditions:  Patient Active Problem List   Diagnosis     Lentigo maligna (H) - sees dermatology annually      Benign neoplasm of choroid - sees ophthalmology at [facility]     Postmenopausal status     Nevus of choroid of right eye     Drusen stage macular degeneration     Rosacea     Uses Nystatin and aquaphor to avoid hemorrhoidal symptoms.     Has bilateral plantar warts. Causing discomfort when walking long periods.     Bunions - wondering about referral. Not causing a ton of pain but has to be mindful of her footwear.      Left lower quadrant abdominal pain over the last few days. Dull ache. No nausea or vomiting. Normal BMs. No fevers or chills. At night laying down can tell if she lays on her stomach, Did Body[employer]ump clss with weights on Monday. So may have pulled a muscle.     We reviewed and updated her medication list as necessary. Her past medical and surgical history as well as her family history were reviewed and updated as necessary.    Gynecological history:  Menopausal symptoms: postmenopausal no bleeding   Last Pap:  9/2018, result normal with negative HPV  History of abnormal Paps: no  Concern for STIs: no  Breast cancer screening: mammogram in 10/7/2022 - BIRADS1    Other health-related habits:  Tobacco: None    Alcohol: Occassional  Drug use: no   DM screening: glucose in 7/19/2022 - normal  Lipids: lipids in 7/19/2022 - , HDL 47,   Hep C screening: negative in 6/2021  Colon cancer screening: colonoscopy in 8/2014 - follow-up 10 years   Skin cancer screening: sees dermatology   Vaccines: UTD, eligible for Covid booster - she thinks she might wait until the fall      OBJECTIVE:   /88   Pulse 97   Temp 97.7  F (36.5  C)   Ht 1.622 m (5' 3.86\")   Wt " 56.7 kg (125 lb)   LMP  (LMP Unknown)   SpO2 94%   BMI 21.55 kg/m     General: Healthy female in NAD.  Cooperative and pleasant.  HEENT: Normocephalic, atraumatic. TMs normal. Supple neck, without lymphadenopathy or thyromegaly.    Breasts: No obvious masses, axillary adenopathy or fibrocystic changes.    Lungs: CTA bilaterally.  CV: RRR, normal S1 and S2, without murmurs, rubs, or gallops appreciated.    Abdomen: Soft, NT, ND.  No masses or hepatosplenomegaly appreciated.    Gyn: Normal appearing external genitalia without lesion.  Vaginal mucosa pale.  Cervix visualized and Pap performed.  Bimanual exam revealed no uterine or ovarian masses.   Extremities: WWW, without deformity, edema.  Skin: One plantar wart on the left foot, and 3 small plantar warts on the right.  Neuro: Grossly intact, nonfocal.  Psych: Mood and affect appropriate.      ASSESSMENT AND PLAN:   Jesus was seen today for physical.    Routine general medical examination at a health care facility  Fasting lipid & BMP ordered.   Mammogram in the fall.   Pap today.   DEXA ordered.   Colonoscopy in 2024.   Continue follow-up with dermatology for lentigo maligna.   Plantar warts x3 treated today. Each wart was frozen easily three times with liquid nitrogen. A total of 3 warts are treated today.  The etiology of common warts were discussed. The patient is to return every 3-4 weeks until all warts have been removed.  LLQ pain - not reproducible on exam today, improving, no systemic s/sx. Discussed diverticulitis - pt did not have diverticula on last colonoscopy. Bimanual exam was normal. Possible muscle strain? Worrisome signs and symptoms were discussed with Jesus and she was instructed to return to the clinic for concerning symptoms or to call with questions.    Skin irritation  The current medical regimen is effective;  continue present plan and medications.  -     nystatin (MYCOSTATIN) 637102 UNIT/GM external ointment; Apply topically 2 times  daily as needed to affected area    Bilateral bunions  Referral to orthopedics/podiatry for bunions.   -     Orthopedic  Referral; Future      Return in about 1 year (around 7/20/2024) for physical.    Dot Clayton MD  Family Medicine

## 2023-07-21 PROBLEM — E78.5 HYPERLIPIDEMIA: Status: ACTIVE | Noted: 2023-07-21

## 2023-07-21 LAB
ANION GAP SERPL CALCULATED.3IONS-SCNC: 10 MMOL/L (ref 7–15)
BUN SERPL-MCNC: 10.8 MG/DL (ref 8–23)
CALCIUM SERPL-MCNC: 9.6 MG/DL (ref 8.6–10)
CHLORIDE SERPL-SCNC: 103 MMOL/L (ref 98–107)
CHOLEST SERPL-MCNC: 247 MG/DL
CREAT SERPL-MCNC: 0.69 MG/DL (ref 0.51–0.95)
DEPRECATED HCO3 PLAS-SCNC: 27 MMOL/L (ref 22–29)
GFR SERPL CREATININE-BSD FRML MDRD: >90 ML/MIN/1.73M2
GLUCOSE SERPL-MCNC: 82 MG/DL (ref 70–99)
HDLC SERPL-MCNC: 60 MG/DL
LDLC SERPL CALC-MCNC: 167 MG/DL
NONHDLC SERPL-MCNC: 187 MG/DL
POTASSIUM SERPL-SCNC: 4.2 MMOL/L (ref 3.4–5.3)
SODIUM SERPL-SCNC: 140 MMOL/L (ref 136–145)
TRIGL SERPL-MCNC: 99 MG/DL

## 2023-07-25 LAB
BKR LAB AP GYN ADEQUACY: NORMAL
BKR LAB AP GYN INTERPRETATION: NORMAL
BKR LAB AP HPV REFLEX: NORMAL
BKR LAB AP PREVIOUS ABNORMAL: NORMAL
PATH REPORT.COMMENTS IMP SPEC: NORMAL
PATH REPORT.COMMENTS IMP SPEC: NORMAL
PATH REPORT.RELEVANT HX SPEC: NORMAL

## 2023-07-27 LAB
HUMAN PAPILLOMA VIRUS 16 DNA: NEGATIVE
HUMAN PAPILLOMA VIRUS 18 DNA: NEGATIVE
HUMAN PAPILLOMA VIRUS FINAL DIAGNOSIS: NORMAL
HUMAN PAPILLOMA VIRUS OTHER HR: NEGATIVE

## 2023-08-08 ENCOUNTER — TELEPHONE (OUTPATIENT)
Dept: FAMILY MEDICINE | Facility: CLINIC | Age: 58
End: 2023-08-08

## 2023-08-08 ENCOUNTER — MYC MEDICAL ADVICE (OUTPATIENT)
Dept: FAMILY MEDICINE | Facility: CLINIC | Age: 58
End: 2023-08-08

## 2023-08-08 DIAGNOSIS — R10.32 LEFT LOWER QUADRANT PAIN: Primary | ICD-10-CM

## 2023-08-08 NOTE — TELEPHONE ENCOUNTER
Pt checked with her insurance regarding getting bone density scan at her age.     Insurance said that as long as procedure code and Dx code entered as preventative it will be covered.    Please check entered correctly and then let patient know she can schedule appointment    Jojo ZULETA

## 2023-08-08 NOTE — TELEPHONE ENCOUNTER
Called Judie and left voice message telling her that Dr. Clayton put the correct diagnosis and procedure code on her Dexa scan order so she can schedule the procedure.    KARUNA Norris, RN  08/08/23, 10:50 AM

## 2023-08-11 ENCOUNTER — ANCILLARY PROCEDURE (OUTPATIENT)
Dept: CT IMAGING | Facility: CLINIC | Age: 58
End: 2023-08-11
Attending: FAMILY MEDICINE
Payer: COMMERCIAL

## 2023-08-11 DIAGNOSIS — R10.32 LEFT LOWER QUADRANT PAIN: ICD-10-CM

## 2023-08-11 PROCEDURE — 74177 CT ABD & PELVIS W/CONTRAST: CPT | Performed by: RADIOLOGY

## 2023-08-11 RX ORDER — IOPAMIDOL 755 MG/ML
73 INJECTION, SOLUTION INTRAVASCULAR ONCE
Status: COMPLETED | OUTPATIENT
Start: 2023-08-11 | End: 2023-08-11

## 2023-08-11 RX ADMIN — IOPAMIDOL 73 ML: 755 INJECTION, SOLUTION INTRAVASCULAR at 11:25

## 2023-08-11 NOTE — DISCHARGE INSTRUCTIONS

## 2023-08-17 ENCOUNTER — ANCILLARY PROCEDURE (OUTPATIENT)
Dept: ULTRASOUND IMAGING | Facility: CLINIC | Age: 58
End: 2023-08-17
Attending: FAMILY MEDICINE
Payer: COMMERCIAL

## 2023-08-17 DIAGNOSIS — R10.32 LEFT LOWER QUADRANT PAIN: ICD-10-CM

## 2023-08-17 PROCEDURE — 76856 US EXAM PELVIC COMPLETE: CPT | Performed by: RADIOLOGY

## 2023-08-17 PROCEDURE — 76830 TRANSVAGINAL US NON-OB: CPT | Performed by: RADIOLOGY

## 2023-08-23 ENCOUNTER — VIRTUAL VISIT (OUTPATIENT)
Dept: FAMILY MEDICINE | Facility: CLINIC | Age: 58
End: 2023-08-23
Payer: COMMERCIAL

## 2023-08-23 DIAGNOSIS — R10.32 LEFT LOWER QUADRANT PAIN: Primary | ICD-10-CM

## 2023-08-23 NOTE — PROGRESS NOTES
"Jesus is a 58 year old who is being evaluated via a billable video visit.      How would you like to obtain your AVS? MyChart  If the video visit is dropped, the invitation should be resent by: Text to cell phone: [phone]  Will anyone else be joining your video visit? No    Assessment & Plan     Left lower quadrant pain  Possible pelvic varicosities/pelvic congestion syndrome based on imaging. Pain has been present for about a month, and is mild. Jesus is reassured by imaging findings that do not show anything more serious. Discussed monitoring symptoms and activity modification for the next few months; if sx persist >6 mo, or worsen, or any red flags, consider vascular surgery referral. Worrisome signs and symptoms were discussed with Jesus and she was instructed to return to the clinic for concerning symptoms or to call with questions.      MD DAWNA Salcedo [facility]    Subjective   Jesus is a 58 year old, presenting for the following health issues:  Results (Discuss pelvis ultrasound)    HPI     Dull ache on the left lower abdomen. Can't sleep on her stomach because of discomfort. Not \"horrible\" pain. Feels unusual for her. Nothing she can think of that exacerbates it. No nausea or vomiting. Normal BMs. No dysuria. No fevers or chills. Pain is not really worse with exercise. Probably a little worse when standing for a longer period of time. No dyspareunia.         Objective       Vitals:  No vitals were obtained today due to virtual visit.    Physical Exam   GENERAL: Healthy, alert and no distress  EYES: Eyes grossly normal to inspection.  No discharge or erythema, or obvious scleral/conjunctival abnormalities.  RESP: No audible wheeze, cough, or visible cyanosis.  No visible retractions or increased work of breathing.    SKIN: Visible skin clear. No significant rash, abnormal pigmentation or lesions.  NEURO: Cranial nerves grossly intact.  Mentation and speech appropriate for " age.  PSYCH: Mentation appears normal, affect normal/bright, judgement and insight intact, normal speech and appearance well-groomed.    Pelvic US 8/17/2023:   IMPRESSION: Prominent pelvic vascular structures especially in the  left adnexa as noted on recent CT examination. No mass lesions.     CT abd/pelvis 8/11/2023:   Prominent left periuterine vasculature. Uterus otherwise unremarkable. Degenerative disc disease at L4-L5.         Video-Visit Details  Type of service:  Video Visit   Originating Location (pt. Location): Other Work  Distant Location (provider location):  On-site  Platform used for Video Visit: Radha

## 2023-09-18 ENCOUNTER — ANCILLARY PROCEDURE (OUTPATIENT)
Dept: BONE DENSITY | Facility: CLINIC | Age: 58
End: 2023-09-18
Attending: FAMILY MEDICINE
Payer: COMMERCIAL

## 2023-09-18 DIAGNOSIS — Z78.0 POSTMENOPAUSAL STATUS: ICD-10-CM

## 2023-09-18 PROCEDURE — 77080 DXA BONE DENSITY AXIAL: CPT | Performed by: INTERNAL MEDICINE

## 2023-09-18 PROCEDURE — 99207 DX WRIST/HEEL/RADIUS: CPT | Performed by: INTERNAL MEDICINE

## 2023-09-19 PROBLEM — M85.9 LOW BONE DENSITY: Status: ACTIVE | Noted: 2023-09-19

## 2023-10-11 NOTE — TELEPHONE ENCOUNTER
DIAGNOSIS: sergio (B), self referred no images    APPOINTMENT DATE: 11/10/23    NOTES STATUS DETAILS   OFFICE NOTE from referring provider Internal SELF   OFFICE NOTE from other specialist Internal 7/20/23 VELASQUEZ MOSS MD - NIKOLAS   MEDICATION LIST Internal    LABS

## 2023-10-13 ENCOUNTER — ANCILLARY PROCEDURE (OUTPATIENT)
Dept: MAMMOGRAPHY | Facility: CLINIC | Age: 58
End: 2023-10-13
Attending: FAMILY MEDICINE
Payer: COMMERCIAL

## 2023-10-13 DIAGNOSIS — Z12.31 VISIT FOR SCREENING MAMMOGRAM: ICD-10-CM

## 2023-10-13 PROCEDURE — 77063 BREAST TOMOSYNTHESIS BI: CPT | Mod: GC | Performed by: RADIOLOGY

## 2023-10-13 PROCEDURE — 77067 SCR MAMMO BI INCL CAD: CPT | Mod: GC | Performed by: RADIOLOGY

## 2023-11-10 ENCOUNTER — OFFICE VISIT (OUTPATIENT)
Dept: ORTHOPEDICS | Facility: CLINIC | Age: 58
End: 2023-11-10
Payer: COMMERCIAL

## 2023-11-10 ENCOUNTER — ANCILLARY PROCEDURE (OUTPATIENT)
Dept: GENERAL RADIOLOGY | Facility: CLINIC | Age: 58
End: 2023-11-10
Attending: PODIATRIST
Payer: COMMERCIAL

## 2023-11-10 ENCOUNTER — PRE VISIT (OUTPATIENT)
Dept: ORTHOPEDICS | Facility: CLINIC | Age: 58
End: 2023-11-10

## 2023-11-10 DIAGNOSIS — M20.11 HALLUX VALGUS OF RIGHT FOOT: ICD-10-CM

## 2023-11-10 DIAGNOSIS — M20.11 HALLUX VALGUS OF RIGHT FOOT: Primary | ICD-10-CM

## 2023-11-10 PROCEDURE — 99024 POSTOP FOLLOW-UP VISIT: CPT | Performed by: PODIATRIST

## 2023-11-10 PROCEDURE — 73630 X-RAY EXAM OF FOOT: CPT | Mod: RT | Performed by: RADIOLOGY

## 2023-11-10 PROCEDURE — 99204 OFFICE O/P NEW MOD 45 MIN: CPT | Performed by: ORTHOPAEDIC SURGERY

## 2023-11-10 NOTE — LETTER
11/10/2023       RE: Jesus Anglin  [address] [address]    Dear Colleague,    Thank you for referring your patient, Jesus Anglin, to the [facility] [place]. Please see a copy of my visit note below.    Orthopaedic Surgery kevin Note - New Patient    HPI:  I had the opportunity to meet this pleasant 58-year-old patient today at the [facility] Dr. Newton Hinds for evaluation and management of a painful right hallux valgus deformity of chronic duration.  The patient endorses that this is quite symptomatic for her and is worsening with time.  She reports that she has had difficulty with wearing shoe wear, and that shoe wear causes the pain to worsen.  She has tried different types of shoewear including shoes with a soft uppers which tend to work the best, as they are stretcher and can accommodate the deformity, but even they do cause discomfort.  She also did try wearing wider shoe wear, but this was too loose around the heel causing the shoe to not fit right and was not very practical.  She has not had surgery for this condition yet; she has had excision of a mole in the first webspace which turned out to be a benign lesion.    ALLERGIES:  Penicillin g and Benzoyl peroxide.    PMH:  The patient has a past medical history of Benign neoplasm of choroid (09/24/2012), Drusen stage macular degeneration (12/06/2018), Lentigo maligna (H), Low bone density (09/18/2023), Mixed hyperlipidemia, Nevus of choroid of right eye (12/06/2018), and Rosacea (07/07/2022).    SH:  The patient reports that she has never smoked. She has never used smokeless tobacco. She reports current alcohol use of about 2.0 standard drinks of alcohol per week. She reports that she does not use drugs.    EXAM:  Examination shows the standing alignment of both feet to be symmetrical with a well-maintained arch and neutral heel bilaterally that turn appropriately into varus with a double leg heel  raise.  3/4 right DP and PT pulses.  Light touch sensation is endorsed as intact in all dermatomes.  5/5 right tib ant, EHL, gastrocsoleus, PTT, and peroneals.  5/5 right EHL, EDL, FHL, and FDL.  There is a clinically apparent, prominent, right hallux valgus deformity as well as a bunionette deformity.  The hallux valgus deformity is partially, though not fully, passively correctable to neutral, and the bunionette is fully passively correctable.  The hallux MTP joint has approximately 45 degrees of passive dorsiflexion.  There is mild tenderness at the MTP joint.    IMAGING:  Independent review of imaging was performed including weightbearing AP, oblique, and lateral right foot radiograph dated today, 11/10/2023.  Imaging results were discussed with the patient.  There is a right hallux valgus present with a an approximately 26 degree hallux valgus angle, and mild arthritic changes at the hallux MTP joint with adequate joint space remaining.  There is a 1-2 LINDA of approximately 13 degrees.  No obvious evidence for tumor, infection, or acute fracture.  Relatively neutral appearing arch with an apex plantar Meary's angle of approximately 5 degrees.    LABS:  I did review the surgical pathology results from her 02/15/2012 mole excision from the first dorsal webspace of the right foot, and confirmed that this was a benign lesion diagnosed as exogenous pigment consistent with traumatic tattoo.    IMPRESSION:  58-year-old patient with symptomatic right hallux valgus with approximately 26 degree hallux valgus angle and approximately 13 degree 1-2 LINDA.    PLAN:  The findings and treatment options, both nonsurgical and surgical, were discussed with the patient in layman's terms.  Full opportunity was given to participate in the shared decision-making process.  We discussed medications both oral and topical, shoewear modifications, custom foot orthoses, corticosteroid injections, various types of bunion correction devices  from toe spacers to forefoot sleeves/padding to bunion splints, and surgery.  I discussed the various surgical options including a hallux MTP joint fusion, distal osteotomy, midshaft osteotomy, proximal osteotomy, and Lapidus or first TMT joint arthrodesis, any which may be with or without a distal soft tissue release to help straighten the toe.  Given the physical exam and radiographic findings, a distal first metatarsal osteotomy such as a Chevron procedure or a midshaft osteotomy such as a Ludloff procedure with a distal soft tissue release would appear to be appropriate.  The risks, benefits, and alternatives were discussed.  The postoperative plan was discussed.  The patient verbalized her wish to think about her treatment options for the time being and contact us should she wish to return to rediscuss options, or to schedule surgery.  I discussed the arthritis in the hallux and that over time, this is likely to gradually worsen and may get bad enough to the point that she may need medical treatment for this, whether conservative or surgical.  We discussed what some of these treatments could entail.  This could entail up to even a hallux MTP fusion, though this does not appear to be indicated at this time.  All questions were answered.        Renato Rodríguez MD

## 2023-11-10 NOTE — PROGRESS NOTES
Called patient to confirm his paperwork has been completed and is ready to be picked up  His paperwork also has been updated for melinda which has been sent to be scanned  Pt interested in sx. Will reschedule.   No charge for this visit.

## 2023-11-10 NOTE — LETTER
11/10/2023         RE: Jesus Anglin  [address] [address]        Dear Colleague,    Thank you for referring your patient, Jesus Anglin, to the [facility] [place]. Please see a copy of my visit note below.    Pt interested in sx. Will reschedule.   No charge for this visit.       Again, thank you for allowing me to participate in the care of your patient.        Sincerely,        Newton Hinds DPM

## 2023-11-10 NOTE — PROGRESS NOTES
Orthopaedic Surgery ekvin Note - New Patient    HPI:  I had the opportunity to meet this pleasant 58-year-old patient today at the [facility] Dr. Newton Hinds for evaluation and management of a painful right hallux valgus deformity of chronic duration.  The patient endorses that this is quite symptomatic for her and is worsening with time.  She reports that she has had difficulty with wearing shoe wear, and that shoe wear causes the pain to worsen.  She has tried different types of shoewear including shoes with a soft uppers which tend to work the best, as they are stretcher and can accommodate the deformity, but even they do cause discomfort.  She also did try wearing wider shoe wear, but this was too loose around the heel causing the shoe to not fit right and was not very practical.  She has not had surgery for this condition yet; she has had excision of a mole in the first webspace which turned out to be a benign lesion.    ALLERGIES:  Penicillin g and Benzoyl peroxide.    PMH:  The patient has a past medical history of Benign neoplasm of choroid (09/24/2012), Drusen stage macular degeneration (12/06/2018), Lentigo maligna (H), Low bone density (09/18/2023), Mixed hyperlipidemia, Nevus of choroid of right eye (12/06/2018), and Rosacea (07/07/2022).    SH:  The patient reports that she has never smoked. She has never used smokeless tobacco. She reports current alcohol use of about 2.0 standard drinks of alcohol per week. She reports that she does not use drugs.    EXAM:  Examination shows the standing alignment of both feet to be symmetrical with a well-maintained arch and neutral heel bilaterally that turn appropriately into varus with a double leg heel raise.  3/4 right DP and PT pulses.  Light touch sensation is endorsed as intact in all dermatomes.  5/5 right tib ant, EHL, gastrocsoleus, PTT, and peroneals.  5/5 right EHL, EDL, FHL, and FDL.  There is a clinically apparent, prominent, right hallux valgus  deformity as well as a bunionette deformity.  The hallux valgus deformity is partially, though not fully, passively correctable to neutral, and the bunionette is fully passively correctable.  The hallux MTP joint has approximately 45 degrees of passive dorsiflexion.  There is mild tenderness at the MTP joint.    IMAGING:  Independent review of imaging was performed including weightbearing AP, oblique, and lateral right foot radiograph dated today, 11/10/2023.  Imaging results were discussed with the patient.  There is a right hallux valgus present with a an approximately 26 degree hallux valgus angle, and mild arthritic changes at the hallux MTP joint with adequate joint space remaining.  There is a 1-2 LINDA of approximately 13 degrees.  No obvious evidence for tumor, infection, or acute fracture.  Relatively neutral appearing arch with an apex plantar Meary's angle of approximately 5 degrees.    LABS:  I did review the surgical pathology results from her 02/15/2012 mole excision from the first dorsal webspace of the right foot, and confirmed that this was a benign lesion diagnosed as exogenous pigment consistent with traumatic tattoo.    IMPRESSION:  58-year-old patient with symptomatic right hallux valgus with approximately 26 degree hallux valgus angle and approximately 13 degree 1-2 LINDA.    PLAN:  The findings and treatment options, both nonsurgical and surgical, were discussed with the patient in layman's terms.  Full opportunity was given to participate in the shared decision-making process.  We discussed medications both oral and topical, shoewear modifications, custom foot orthoses, corticosteroid injections, various types of bunion correction devices from toe spacers to forefoot sleeves/padding to bunion splints, and surgery.  I discussed the various surgical options including a hallux MTP joint fusion, distal osteotomy, midshaft osteotomy, proximal osteotomy, and Lapidus or first TMT joint arthrodesis, any  which may be with or without a distal soft tissue release to help straighten the toe.  Given the physical exam and radiographic findings, a distal first metatarsal osteotomy such as a Chevron procedure or a midshaft osteotomy such as a Ludloff procedure with a distal soft tissue release would appear to be appropriate.  The risks, benefits, and alternatives were discussed.  The postoperative plan was discussed.  The patient verbalized her wish to think about her treatment options for the time being and contact us should she wish to return to rediscuss options, or to schedule surgery.  I discussed the arthritis in the hallux and that over time, this is likely to gradually worsen and may get bad enough to the point that she may need medical treatment for this, whether conservative or surgical.  We discussed what some of these treatments could entail.  This could entail up to even a hallux MTP fusion, though this does not appear to be indicated at this time.  All questions were answered.

## 2023-11-28 ENCOUNTER — OFFICE VISIT (OUTPATIENT)
Dept: DERMATOLOGY | Facility: CLINIC | Age: 58
End: 2023-11-28
Payer: COMMERCIAL

## 2023-11-28 DIAGNOSIS — L71.9 ROSACEA: ICD-10-CM

## 2023-11-28 DIAGNOSIS — L57.8 ACTINIC SKIN DAMAGE: ICD-10-CM

## 2023-11-28 DIAGNOSIS — Z85.820 HISTORY OF MELANOMA: Primary | ICD-10-CM

## 2023-11-28 PROCEDURE — 99214 OFFICE O/P EST MOD 30 MIN: CPT | Performed by: DERMATOLOGY

## 2023-11-28 RX ORDER — TRETINOIN 0.25 MG/G
CREAM TOPICAL
Qty: 45 G | Refills: 11 | Status: SHIPPED | OUTPATIENT
Start: 2023-11-28 | End: 2023-11-28

## 2023-11-28 RX ORDER — TRETINOIN 0.25 MG/G
CREAM TOPICAL
Qty: 45 G | Refills: 11 | Status: SHIPPED | OUTPATIENT
Start: 2023-11-28

## 2023-11-28 ASSESSMENT — PAIN SCALES - GENERAL: PAINLEVEL: NO PAIN (0)

## 2023-11-28 NOTE — PROGRESS NOTES
[facility] Dermatology Note  Encounter Date: Nov 28, 2023  Office Visit     Dermatology Problem List:  # Lentigo maligna, left nasal ala  - Mohs excision 8/2010 with reconstruction by plastics and surface treatments by Dr. Martinez in the year after surgery.  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 On exam, it is still 5mm with reassuring pigmentation pattern on dermoscopy. No clinical images from 2022 for comparison. Getting images today and will have patient monitor for changes. Discussed ABCDEs of melanoma and return precautions. Patient is in agreement with plan. On exam, also benign nevi, sebK, idiopathic guttate hypomelanosis, and cherry angiomas. Discussed benign nature of these lesions and provided reassurance. No evidence of cancer recurrence of L nasal ala.   -Monitor R sole 5mm brown macule for changes  -Recommend annual skin checks    #Rosacea, well-controlled  Patient reports that her rosacea is well-controlled. She is requesting refills of medication. Plan to continue current regimen.   -Tretinoin 0.025% cream daily  -Metronidazole 0.75% cream daily    Follow-up: 1 year in-person, or earlier for new or changing lesions    Staff and Medical Student:     Seen and staffed with Dr. Kai Quinn, MS3    I was present with the medical student who participated in the service and in the documentation of the note. I have verified the history and personally performed the physical exam and medical decision making.  I agree with the assessment and plan of care as documented in the note.  Anna Quinn MD   ____________________________________________    CC: Derm Problem (FBSE- no spots of concern)    HPI:  Ms. Jesus Anglin is a(n) 58 year old female who presents today as a return patient for annual skin check. She has no lesions of concern. No spots have been itching, painful, or bleeding. Her rosacea is well-controlled with the current regimen. She has no questions or concerns.     Labs:  None    Physical Exam:  Vitals: LMP  (LMP Unknown)   SKIN: Full skin, which includes the head/face, both arms, chest, back, abdomen,both legs, buttocks, digits and/or nails, was examined. Genitalia was not examined.   - L nasal ala without evidence of cancer recurrence  -R plantar foot with 5mm brown macule, pigmentation in the furrows under dermoscopy  -Bright red and purple macules on trunk and extremities  -Waxy, stuck-on tan/brown papules on trunk and extremities  -Multiple brown pigmented macules on sun-exposed skin; symmetric and regular pigmentation under dermoscopy  - White, hypopigmented macules on extremities  - No cervical, submandibular, axillary or inguinal adenopathy  - No other lesions of concern on areas examined.     Medications:  Current Outpatient Medications   Medication    Cyanocobalamin (B-12 PO)    metroNIDAZOLE (METROCREAM) 0.75 % external cream    Multiple Vitamins-Minerals (MULTI COMPLETE PO)    nystatin (MYCOSTATIN) 534430 UNIT/GM external ointment    tretinoin (RETIN-A) 0.025 % external cream     No current facility-administered medications for this visit.      Past Medical History:   Patient Active Problem List   Diagnosis    Lentigo maligna (H)    Benign neoplasm of choroid    Nevus of choroid of right eye    Drusen stage macular degeneration    Rosacea    Hyperlipidemia    Low bone density     Past Medical History:   Diagnosis Date    Benign neoplasm of choroid 09/24/2012    Drusen stage macular  degeneration 12/06/2018    Lentigo maligna (H)     Low bone density 09/18/2023    Mixed hyperlipidemia     Nevus of choroid of right eye 12/06/2018    Rosacea 07/07/2022

## 2023-11-28 NOTE — NURSING NOTE
Dermatology Rooming Note    Jesus Anglin's goals for this visit include:   Chief Complaint   Patient presents with    Derm Problem     FBSE- no spots of concern     Damaris Jon, EMT

## 2023-11-28 NOTE — LETTER
11/28/2023       RE: Jesus Anglin  [address] [facility] [address]     Dear Colleague,    Thank you for referring your patient, Jesus Anglin, to the [facility] DERMATOLOGY CLINIC [place] at [facility]. Please see a copy of my visit note below.    [facility] Dermatology Note  Encounter Date: Nov 28, 2023  Office Visit     Dermatology Problem List:  # Lentigo maligna, left nasal ala  - Mohs excision 8/2010 with reconstruction by plastics and surface treatments by Dr. Martinez in the year after surgery.  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 On exam, it is still 5mm with reassuring pigmentation pattern on dermoscopy. No clinical images from 2022 for comparison. Getting images today and will have patient monitor for changes. Discussed ABCDEs of melanoma and return precautions. Patient is in agreement with plan. On exam, also benign nevi, sebK, idiopathic guttate hypomelanosis, and cherry angiomas. Discussed benign nature of these lesions and provided reassurance. No evidence of cancer recurrence of L nasal ala.   -Monitor R sole 5mm brown macule for changes  -Recommend annual skin checks    #Rosacea, well-controlled  Patient reports that her rosacea is well-controlled. She is requesting refills of medication. Plan to continue current regimen.   -Tretinoin 0.025% cream daily  -Metronidazole 0.75% cream daily    Follow-up: 1 year in-person, or earlier for  new or changing lesions    Staff and Medical Student:     Seen and staffed with Dr. Kai Quinn, MS3    I was present with the medical student who participated in the service and in the documentation of the note. I have verified the history and personally performed the physical exam and medical decision making. I agree with the assessment and plan of care as documented in the note.  Anna Quinn MD   ____________________________________________    CC: Derm Problem (FBSE- no spots of concern)    HPI:  Ms. Jesus Anglin is a(n) 58 year old female who presents today as a return patient for annual skin check. She has no lesions of concern. No spots have been itching, painful, or bleeding. Her rosacea is well-controlled with the current regimen. She has no questions or concerns.     Labs:  None    Physical Exam:  Vitals: LMP  (LMP Unknown)   SKIN: Full skin, which includes the head/face, both arms, chest, back, abdomen,both legs, buttocks, digits and/or nails, was examined. Genitalia was not examined.   - L nasal ala without evidence of cancer recurrence  -R plantar foot with 5mm brown macule, pigmentation in the furrows under dermoscopy  -Bright red and purple macules on trunk and extremities  -Waxy, stuck-on tan/brown papules on trunk and extremities  -Multiple brown pigmented macules on sun-exposed skin; symmetric and regular pigmentation under dermoscopy  - White, hypopigmented macules on extremities  - No cervical, submandibular, axillary or inguinal adenopathy  - No other lesions of concern on areas examined.     Medications:  Current Outpatient Medications   Medication    Cyanocobalamin (B-12 PO)    metroNIDAZOLE (METROCREAM) 0.75 % external cream    Multiple Vitamins-Minerals (MULTI COMPLETE PO)    nystatin (MYCOSTATIN) 587462 UNIT/GM external ointment    tretinoin (RETIN-A) 0.025 % external cream     No current facility-administered medications for this visit.      Past Medical History:    Patient Active Problem List   Diagnosis    Lentigo maligna (H)    Benign neoplasm of choroid    Nevus of choroid of right eye    Drusen stage macular degeneration    Rosacea    Hyperlipidemia    Low bone density     Past Medical History:   Diagnosis Date    Benign neoplasm of choroid 09/24/2012    Drusen stage macular degeneration 12/06/2018    Lentigo maligna (H)     Low bone density 09/18/2023    Mixed hyperlipidemia     Nevus of choroid of right eye 12/06/2018    Rosacea 07/07/2022

## 2024-06-13 NOTE — MR AVS SNAPSHOT
After Visit Summary   6/15/2018    Jesus Anglin    MRN: [id number]           Patient Information     Date Of Birth          1965        Visit Information        Provider Department      6/15/2018 9:45 AM Debo Sparks PA-C [facility] Dermatology        Today's Diagnoses     History of melanoma in situ    -  1    Seborrheic keratoses, inflamed        Skin cancer screening           Follow-ups after your visit        Follow-up notes from your care team     Return in about 6 months (around 12/15/2018).      Your next 10 appointments already scheduled     Sep 07, 2018  3:40 PM CDT   PHYSICAL with Faye Beck MD   [facility] ([facility])    [address]  [place] [address]   [phone]              Who to contact     Please call your clinic at [phone] to:    Ask questions about your health    Make or cancel appointments    Discuss your medicines    Learn about your test results    Speak to your doctor            Additional Information About Your Visit        [email or web address] Information     [employer] gives you secure access to your electronic health record. If you see a primary care provider, you can also send messages to your care team and make appointments. If you have questions, please call your primary care clinic.  If you do not have a primary care provider, please call [phone] and they will assist you.      [employer] is an electronic gateway that provides easy, online access to your medical records. With [employer], you can request a clinic appointment, read your test results, renew a prescription or communicate with your care team.     To access your existing account, please contact your [facility] or call [phone] for assistance.        Care EveryWhere ID     This is your Care EveryWhere ID. This could be used by other organizations to access your [facility] medical records  [id number]          Blood Pressure from Last 3 Encounters:   08/18/17 116/71   05/30/17 110/71   03/31/17 112/72    Weight from Last 3 Encounters:   08/18/17 61.7 kg (136 lb 1.3 oz)   05/30/17 60.8 kg (134 lb)   03/31/17 61.7 kg (136 lb)              We Performed the Following     DESTRUCT BENIGN LESION, UP TO 14        Primary Care Provider Office Phone # Fax #    Faye Beck [phone]       [address] [address]        Equal Access to Services     [facility]: Hadii aad ku hadasho Soanna, waaxda luqadaha, qaybta kaalmada ariannayaanita, kapil crowley . So [facility] [phone].    ATENCIÓN: Si habla español, tiene a albarado disposición servicios gratuitos de asistencia lingüística. Llame al [phone].    We comply with applicable federal civil rights laws and [place] laws. We do not discriminate on the basis of race, color, national origin, age, disability, sex, sexual orientation, or gender identity.            Thank you!     Thank you for choosing [facility] DERMATOLOGY  for your care. Our goal is always to provide you with excellent care. Hearing back from our patients is one way we can continue to improve our services. Please take a few minutes to complete the written survey that you may receive in the mail after your visit with us. Thank you!             Your Updated Medication List - Protect others around you: Learn how to safely use, store and throw away your medicines at www.disposemymeds.org.          This list is accurate as of 6/15/18 12:02 PM.  Always use your most recent med list.                   Brand Name Dispense Instructions for use Diagnosis    azithromycin 500 MG tablet    ZITHROMAX    3 tablet    Take 1 tablet (500 mg) by mouth daily Use for 1-3 days to treat severe diarrhea associated with travel    Travel advice encounter       fish oil-omega-3 fatty acids 1000 MG capsule      Take 1 capsule by mouth daily.        MIRENA (52 MG) 20 MCG/24HR  IUD   Generic drug:  levonorgestrel      Use as directed        multivitamin per tablet      Take 1 tablet by mouth daily.        nystatin ointment    MYCOSTATIN    30 g    APPLY TOPICALLY TO AFFECTED AREA 2 TIMES DAILY    Dysuria       tretinoin 0.025 % cream    RETIN-A    135 g    daily    Actinic skin damage       VITAMIN D (CHOLECALCIFEROL) PO      Take by mouth daily           Patient

## 2024-06-20 ENCOUNTER — OFFICE VISIT (OUTPATIENT)
Dept: FAMILY MEDICINE | Facility: CLINIC | Age: 59
End: 2024-06-20
Payer: COMMERCIAL

## 2024-06-20 VITALS
WEIGHT: 134.04 LBS | RESPIRATION RATE: 16 BRPM | SYSTOLIC BLOOD PRESSURE: 121 MMHG | BODY MASS INDEX: 23.11 KG/M2 | TEMPERATURE: 98.2 F | DIASTOLIC BLOOD PRESSURE: 70 MMHG | OXYGEN SATURATION: 98 % | HEART RATE: 65 BPM

## 2024-06-20 DIAGNOSIS — H10.9 BACTERIAL CONJUNCTIVITIS: ICD-10-CM

## 2024-06-20 DIAGNOSIS — H00.015 HORDEOLUM EXTERNUM OF LEFT LOWER EYELID: Primary | ICD-10-CM

## 2024-06-20 RX ORDER — ERYTHROMYCIN 5 MG/G
0.5 OINTMENT OPHTHALMIC 3 TIMES DAILY
Qty: 1 G | Refills: 0 | Status: SHIPPED | OUTPATIENT
Start: 2024-06-20 | End: 2024-08-01

## 2024-06-20 ASSESSMENT — PAIN SCALES - GENERAL: PAINLEVEL: NO PAIN (0)

## 2024-06-20 NOTE — PROGRESS NOTES
Charu Mccain   Preferred Name:   None  Female, 48 year old, 1970  Phone:   *[phone] (Mobile) [phone] (Home Phone)  Last Weight:   87 kg  PCP:   Cruz Dozier MD  Need Interp:   No  Language:   English  Allergies  Penicillin G  Primary Ins:   MEDICARE  MRN:   [id number]  Patient Portal:   Active  Last Logon:   02/12/19  Next Appt With Me:   None  Last Appt With Me:    Dr Powell [facility] 2/19/19   Received: Today   Message Contents   [facility] Scheduling Pool Cc: Jennifer De Jesus             Hi       Please review the changes below:       Procedure: EGD       Current date: 2/19/19   Current time: 11:15AM   Current facility: [facility]       Rescheduled date: 2/19/19   Rescheduled time: 10:00AM   Rescheduled facility: [facility]   If facility changed, new case created?: N/A           Spoke with patient moved up due to Gap in Schedule       Thanks,   GI Surgery Scheduling Team         STACIE STROUD    Preferred Name: Jesus     59 year old  Chief Complaint   Patient presents with    Eye Problem     Discharge from left eye: patient says it started as a stye. Patient says her left eye is irritated and painful        Blood pressure 121/70, pulse 65, temperature 98.2  F (36.8  C), temperature source Temporal, resp. rate 16, weight 60.8 kg (134 lb 0.6 oz), SpO2 98%, not currently breastfeeding. Body mass index is 23.11 kg/m .  BP completed using cuff size:        Patient Active Problem List   Diagnosis    Lentigo maligna (H)    Benign neoplasm of choroid    Nevus of choroid of right eye    Drusen stage macular degeneration    Rosacea    Hyperlipidemia    Low bone density       Wt Readings from Last 2 Encounters:   06/20/24 60.8 kg (134 lb 0.6 oz)   07/20/23 56.7 kg (125 lb)     BP Readings from Last 3 Encounters:   06/20/24 121/70   07/20/23 131/88   07/07/22 129/70       Allergies   Allergen Reactions    Penicillin G Hives    Benzoyl Peroxide Swelling and Rash       Current Outpatient Medications   Medication Sig Dispense Refill    Cyanocobalamin (B-12 PO)       metroNIDAZOLE (METROCREAM) 0.75 % external cream Apply to face 1-2 times a day. 45 g 11    Multiple Vitamins-Minerals (MULTI COMPLETE PO)       nystatin (MYCOSTATIN) 741826 UNIT/GM external ointment Apply topically 2 times daily as needed to affected area 30 g 0    tretinoin (RETIN-A) 0.025 % external cream Apply to face every night 45 g 11     No current facility-administered medications for this visit.       Social History     Tobacco Use    Smoking status: Never    Smokeless tobacco: Never   Substance Use Topics    Alcohol use: Yes     Alcohol/week: 2.0 standard drinks of alcohol     Types: 2 Standard drinks or equivalent per week    Drug use: Never       Honoring Choices - Health Care Directive Guide offered to patient at time of visit.    Health Maintenance Due   Topic Date Due    PHQ-2 (once per calendar year)  01/01/2024    RSV  VACCINE (Pregnancy & 60+) (1 - 1-dose 60+ series) Never done    DTAP/TDAP/TD IMMUNIZATION (2 - Td or Tdap) 04/04/2024    YEARLY PREVENTIVE VISIT  07/20/2024    LIPID  07/20/2024       Immunization History   Administered Date(s) Administered    COVID-19 12+ (2023-24) (MODERNA) 10/12/2023    COVID-19 MONOVALENT 12+ (Pfizer) 04/12/2021, 05/03/2021    COVID-19 Monovalent 12+ (Pfizer 2022) 05/10/2022    COVID-19 Monovalent Booster 18+ (Moderna) 11/24/2021    Hepatitis A (ADULT 19+) 05/30/2017, 04/16/2019    Hepatitis B, Adult 04/16/2019, 05/14/2019, 10/16/2019    Influenza (IIV3) PF 09/07/2012, 10/07/2014, 09/29/2020    Influenza Vaccine >6 months,quad, PF 10/06/2021, 09/12/2022    MMR 06/19/2002    TD,PF 7+ (Tenivac) 01/01/2004, 07/31/2008    TDAP Vaccine (Boostrix) 04/04/2014    Typhoid IM 05/30/2017, 05/30/2017, 04/16/2019    Zoster recombinant adjuvanted (SHINGRIX) 09/29/2020, 01/06/2021       Lab Results   Component Value Date    PAP NIL 09/07/2018       Recent Labs   Lab Test 07/20/23  1501 07/19/22  1200 06/02/21  1506 09/07/18  1628 03/31/17  1656 03/31/17  1656 04/22/16  1625   *  --  123.0 162*  --   --   --    HDL 60  --  54.0 49*  --   --   --    TRIG 99 166* 181.0* 169*   < >  --   --    ALT  --   --  15.0  --   --   --   --    CR 0.69  --   --  0.71   < > 0.73  --    GFRESTIMATED >90  --   --  86   < > 83  --    GFRESTBLACK  --   --   --  >90  --  >90  African American GFR Calc    --    POTASSIUM 4.2  --   --  4.8   < > 4.2  --    TSH  --   --   --   --   --   --  1.42    < > = values in this interval not displayed.           8/22/2023    10:15 AM 7/20/2023     2:27 PM   PHQ-2 ( 1999 Pfizer)   Q1: Little interest or pleasure in doing things 0 0   Q2: Feeling down, depressed or hopeless 0 0   PHQ-2 Score 0 0   Q1: Little interest or pleasure in doing things Not at all    Q2: Feeling down, depressed or hopeless Not at all    PHQ-2 Score 0             No data to display                     No data to  display                     No data to display                Gregory Gasca    June 20, 2024 11:17 AM

## 2024-06-20 NOTE — PATIENT INSTRUCTIONS
Use antibiotic (erythromycin) ointment on the inside of the lower eyelid three times per day until clear (5-7 days)    Use warm compresses 3-4x/day    If no improvement in 1 week or worsening symptoms, schedule with the eye clinic.

## 2024-06-20 NOTE — PROGRESS NOTES
Assessment & Plan     Jesus was seen today for eye problem.    Diagnoses and all orders for this visit:    Hordeolum externum of left lower eyelid  -     Adult Eye  Referral; Future    Bacterial conjunctivitis  -     erythromycin (ROMYCIN) 5 MG/GM ophthalmic ointment; Place 0.5 inches Into the left eye 3 times daily      Stye present - supportive cares discussed  Due to concurrent mattering of eye, drainage and erythema will also cover for bacterial conjunctivitis  Follow-up with eye clinic if no improvement or worsening symptoms.      Subjective   Jesus is a 59 year old, presenting for the following health issues:  Eye Problem (Discharge from left eye: patient says it started as a stye. Patient says her left eye is irritated and painful )    HPI     Started yesterday - thought it was a stye  Has been using warm compresses and artificial tears    Scratching, uncomfortable  No significant changes in vision    No pain with EOMI    Woke up with eye matted shut this morning    R eye is fine, unaffected  Started as a bump near lower lid    No history of similar    No runny nose, sore throat  Has some seasonal allergies, currently well managed.  Some chronic dry eyes, uses artificial tears for this      Objective    /70 (BP Location: Left arm, Patient Position: Sitting, Cuff Size: Adult Regular)   Pulse 65   Temp 98.2  F (36.8  C) (Temporal)   Resp 16   Wt 60.8 kg (134 lb 0.6 oz)   LMP  (LMP Unknown)   SpO2 98%   BMI 23.11 kg/m    Body mass index is 23.11 kg/m .  Physical Exam   General: Well-appearing in NAD   HEENT: Normocephalic, atraumatic. Mucus membranes moist. L eye with stye to medial, lower lid. There is erythema of the conjunctiva and some tearing/drainage present. EOMI are intact. Pupils normal  Resp: No respiratory distress   MSK: No peripheral edema.   Skin: No rashes or lesions noted.   Psych: Appropriate affect. Mood is good       Signed Electronically by: Harini Alvarez MD

## 2024-07-01 ENCOUNTER — OFFICE VISIT (OUTPATIENT)
Dept: FAMILY MEDICINE | Facility: CLINIC | Age: 59
End: 2024-07-01
Payer: COMMERCIAL

## 2024-07-01 VITALS
OXYGEN SATURATION: 97 % | HEIGHT: 64 IN | WEIGHT: 134.08 LBS | HEART RATE: 66 BPM | TEMPERATURE: 97.1 F | DIASTOLIC BLOOD PRESSURE: 71 MMHG | BODY MASS INDEX: 22.89 KG/M2 | SYSTOLIC BLOOD PRESSURE: 111 MMHG

## 2024-07-01 DIAGNOSIS — R31.29 MICROHEMATURIA: ICD-10-CM

## 2024-07-01 DIAGNOSIS — N90.89 VULVAR IRRITATION: Primary | ICD-10-CM

## 2024-07-01 LAB
ALBUMIN UR-MCNC: NEGATIVE MG/DL
APPEARANCE UR: CLEAR
BACTERIAL VAGINOSIS VAG-IMP: NEGATIVE
BILIRUB UR QL STRIP: NEGATIVE
CANDIDA DNA VAG QL NAA+PROBE: DETECTED
CANDIDA GLABRATA / CANDIDA KRUSEI DNA: NOT DETECTED
CAOX CRY #/AREA URNS HPF: ABNORMAL /HPF
COLOR UR AUTO: YELLOW
GLUCOSE UR STRIP-MCNC: NEGATIVE MG/DL
HGB UR QL STRIP: ABNORMAL
KETONES UR STRIP-MCNC: NEGATIVE MG/DL
LEUKOCYTE ESTERASE UR QL STRIP: NEGATIVE
NITRATE UR QL: NEGATIVE
PH UR STRIP: 7 [PH] (ref 5–7)
RBC URINE: 3 /HPF
SP GR UR STRIP: 1.01 (ref 1–1.03)
T VAGINALIS DNA VAG QL NAA+PROBE: NOT DETECTED
TRANSITIONAL EPI: <1 /HPF
UROBILINOGEN UR STRIP-ACNC: 0.2 E.U./DL
WBC URINE: 0 /HPF

## 2024-07-01 PROCEDURE — 0352U MULTIPLEX VAGINAL PANEL BY PCR: CPT | Mod: ORL | Performed by: FAMILY MEDICINE

## 2024-07-01 PROCEDURE — 81015 MICROSCOPIC EXAM OF URINE: CPT | Mod: ORL | Performed by: FAMILY MEDICINE

## 2024-07-01 NOTE — PROGRESS NOTES
"  Assessment & Plan   Jesus Anglin is a 59 year old year old female who presents to clinic today with more then 1 week of vulvar discomfort, which partially responded to OTC monistat     Vulvar irritation  Will check vaginal multiplex panel.  If yeast, would treat with fluconazole.  If BV - patient prefers cream to oral metronidazole.  Sent urine for microscopy given + for blood on dip.  If testing negative, continue to monitor.  Could consider trial of vaginal estrogen.  - Urinalysis Macroscopic; Future  - Urinalysis Macroscopic  - Urine Microscopic ([place]); Future  - Multiplex Vaginal Panel by PCR; Future  - Multiplex Vaginal Panel by PCR  - UA Microscopic with Reflex to Culture; Future  - UA Microscopic with Reflex to Culture        Subjective   Jesus is a 59 year old, presenting for the following health issues:  Urinary Problem (Lower abdominal pain, pt reports she has an \"off \" feeling)    HPI     Feels like something is going on  - started more then a week ago  - pelvic/vulvar discomfort  - no discharge, no itching   - no pain with urination  - maybe more frequency, but also drinks a lot   - no change in urgency   - no new sexual partners  - no difficulty with vaginal dryness   - menopause - late 50s   - eating okay  - no fever or chills   - tried Monistat thinking it might be yeast, and it maybe helped some     Stye  - getting better         Objective    /71   Pulse 66   Temp 97.1  F (36.2  C)   Ht 1.622 m (5' 3.86\")   Wt 60.8 kg (134 lb 1.3 oz)   LMP  (LMP Unknown)   SpO2 97%   BMI 23.12 kg/m    Body mass index is 23.12 kg/m .  Physical Exam   GENERAL: alert and no distress   (female): normal female external genitalia, normal urethral meatus, normal vaginal mucosa.  Tissue appears post menopausal         UA  Trace blood     Signed Electronically by: Sasha Ferro MD    "

## 2024-07-02 PROBLEM — R31.29 MICROHEMATURIA: Status: ACTIVE | Noted: 2024-07-02

## 2024-07-02 RX ORDER — FLUCONAZOLE 150 MG/1
150 TABLET ORAL ONCE
Qty: 1 TABLET | Refills: 0 | Status: SHIPPED | OUTPATIENT
Start: 2024-07-02 | End: 2024-07-02

## 2024-07-12 ENCOUNTER — LAB (OUTPATIENT)
Dept: LAB | Facility: CLINIC | Age: 59
End: 2024-07-12
Payer: COMMERCIAL

## 2024-07-12 DIAGNOSIS — R31.29 MICROHEMATURIA: ICD-10-CM

## 2024-07-12 LAB
ALBUMIN UR-MCNC: NEGATIVE MG/DL
APPEARANCE UR: CLEAR
BILIRUB UR QL STRIP: NEGATIVE
COLOR UR AUTO: ABNORMAL
GLUCOSE UR STRIP-MCNC: NEGATIVE MG/DL
HGB UR QL STRIP: ABNORMAL
KETONES UR STRIP-MCNC: NEGATIVE MG/DL
LEUKOCYTE ESTERASE UR QL STRIP: NEGATIVE
NITRATE UR QL: NEGATIVE
PH UR STRIP: 6.5 [PH] (ref 5–7)
RBC URINE: 2 /HPF
SP GR UR STRIP: 1.02 (ref 1–1.03)
SQUAMOUS EPITHELIAL: <1 /HPF
TRANSITIONAL EPI: <1 /HPF
UROBILINOGEN UR STRIP-MCNC: NORMAL MG/DL
WBC URINE: 1 /HPF

## 2024-07-12 PROCEDURE — 81001 URINALYSIS AUTO W/SCOPE: CPT | Mod: ORL | Performed by: FAMILY MEDICINE

## 2024-07-14 NOTE — PROGRESS NOTES
Jesus is a 59 year old who is being evaluated via a billable video visit.    How would you like to obtain your AVS? MyChart  If the video visit is dropped, the invitation should be resent by: Text to cell phone: [phone]  Will anyone else be joining your video visit? No      Assessment & Plan   Jesus Anglin is a 59 year old year old female who presents to clinic today to discuss microhematuria.    Microhematuria  Discussed work up. Patient amenable to plan   - CT Urogram wo & w Contrast; Future  - Adult Urology  Referral; Future          Subjective   Jesus is a 59 year old, presenting for the following health issues:  Results (Follow up on UA lab results.)    HPI     Microhematuria   - new finding  - never a smoker     Cyrstals  - younger sister has had kidney stones several times   - has changed recently - tried a collegan powder 4 tablespoons to her tea - for more protein.  Plant based - can contribute to stone formation per patient research   - collegan helps with hair and nails   - has plant based diet    Hx of left sided pain  - last year, resolved  - had work up  - CT abdomen and pelvis 8/11/23 - no stones, normal   - pelvic ultrasound - enlarged blood vessel around the uterus         Objective       LMP  (LMP Unknown)       Physical Exam   General: Alert, pleasant and cooperative in no acute distress.    Resp: Breathing comfortably, no respiratory distress, speaking in full sentences   Psych: well groomed, mood appropriate to content of speech, linear thoughts, no pressured speech    Reviewed results     UA  7/1  - trace blood, 3 RBC per HPF - had yeast infection at the time    - repeat UA 7/12 - trace blood, 2 RBC per HPF    - CT abdomen and pelvis 8/11/23 - no kidney stones, normal CT    - Pelvic ultrasound 8/17/23 - prominent pelvic vascular       Video-Visit Details    Type of service:  Video Visit   Originating Location (pt. Location): Home    Distant Location (provider location):   On-site  Platform used for Video Visit: Taya (Telehealth)  Signed Electronically by: Sasha Ferro MD

## 2024-07-15 ENCOUNTER — VIRTUAL VISIT (OUTPATIENT)
Dept: FAMILY MEDICINE | Facility: CLINIC | Age: 59
End: 2024-07-15
Payer: COMMERCIAL

## 2024-07-15 DIAGNOSIS — R31.29 MICROHEMATURIA: Primary | ICD-10-CM

## 2024-07-15 NOTE — PATIENT INSTRUCTIONS
You had some red blood cells in your urine, not a lot - just a few    The recommendation is to get a CT urogram - a type of pictures.  Call radiology [phone].  You can call and schedule that today.     And schedule a visit with urology, after the CT urogram.  Give them a few days before calling them.  [facility] [phone].  You want to schedule that for at least 2 days after the urogram.

## 2024-07-24 ENCOUNTER — PRE VISIT (OUTPATIENT)
Dept: UROLOGY | Facility: CLINIC | Age: 59
End: 2024-07-24

## 2024-07-24 NOTE — TELEPHONE ENCOUNTER
Reason for visit: microhematuria     Relevant information: hx resolved left sided pain, CT in 2023 - no kidney stones    Records/imaging/labs/orders: all records available    Pt called: No need for a call    At Rooming: standard procedure    Linn Ramirez  7/24/2024  9:37 AM

## 2024-07-25 ENCOUNTER — ANCILLARY PROCEDURE (OUTPATIENT)
Dept: CT IMAGING | Facility: CLINIC | Age: 59
End: 2024-07-25
Attending: FAMILY MEDICINE
Payer: COMMERCIAL

## 2024-07-25 DIAGNOSIS — R31.29 MICROHEMATURIA: ICD-10-CM

## 2024-07-25 LAB
CREAT BLD-MCNC: 0.8 MG/DL (ref 0.5–1)
EGFRCR SERPLBLD CKD-EPI 2021: >60 ML/MIN/1.73M2

## 2024-07-25 PROCEDURE — 74178 CT ABD&PLV WO CNTR FLWD CNTR: CPT | Mod: GC | Performed by: RADIOLOGY

## 2024-07-25 PROCEDURE — 82565 ASSAY OF CREATININE: CPT | Performed by: PATHOLOGY

## 2024-07-25 RX ORDER — IOPAMIDOL 755 MG/ML
76 INJECTION, SOLUTION INTRAVASCULAR ONCE
Status: COMPLETED | OUTPATIENT
Start: 2024-07-25 | End: 2024-07-25

## 2024-07-25 RX ADMIN — IOPAMIDOL 76 ML: 755 INJECTION, SOLUTION INTRAVASCULAR at 07:20

## 2024-07-25 NOTE — DISCHARGE INSTRUCTIONS

## 2024-07-26 NOTE — TELEPHONE ENCOUNTER
MEDICAL RECORDS REQUEST   [facility]  Urology Clinic  [address] [place] [address]  PHONE: [phone]  Fax: [phone]        FUTURE VISIT INFORMATION                                                   Jesus Anglin, : 1965 scheduled for future visit at [facility]    APPOINTMENT INFORMATION:  Date: 2024  Provider:  Charis Holley CNP  Reason for Visit/Diagnosis: Microhematuria    REFERRAL INFORMATION:  Referring provider:  Sasha Ferro MD in [facility] PRIMARY CARE      RECORDS REQUESTED FOR VISIT                                                     NOTES  STATUS/DETAILS   OFFICE NOTE from referring provider  yes, 07/15/2024, 2024 -- Sasha Ferro MD in [facility] PRIMARY [facility]ARE   MEDICATION LIST  yes   LABS     URINALYSIS (UA)  yes   images  yes, 2024 -- CT UROGRAM     PRE-VISIT CHECKLIST      Joint diagnostic appointment coordinated correctly          (ensure right order & amount of time) Yes   RECORD COLLECTION COMPLETE Yes

## 2024-07-27 SDOH — HEALTH STABILITY: PHYSICAL HEALTH: ON AVERAGE, HOW MANY DAYS PER WEEK DO YOU ENGAGE IN MODERATE TO STRENUOUS EXERCISE (LIKE A BRISK WALK)?: 6 DAYS

## 2024-07-27 SDOH — HEALTH STABILITY: PHYSICAL HEALTH: ON AVERAGE, HOW MANY MINUTES DO YOU ENGAGE IN EXERCISE AT THIS LEVEL?: 60 MIN

## 2024-07-27 ASSESSMENT — SOCIAL DETERMINANTS OF HEALTH (SDOH): HOW OFTEN DO YOU GET TOGETHER WITH FRIENDS OR RELATIVES?: ONCE A WEEK

## 2024-07-30 ENCOUNTER — PRE VISIT (OUTPATIENT)
Dept: UROLOGY | Facility: CLINIC | Age: 59
End: 2024-07-30

## 2024-07-30 ENCOUNTER — VIRTUAL VISIT (OUTPATIENT)
Dept: UROLOGY | Facility: CLINIC | Age: 59
End: 2024-07-30
Attending: FAMILY MEDICINE
Payer: COMMERCIAL

## 2024-07-30 DIAGNOSIS — R31.29 MICROHEMATURIA: ICD-10-CM

## 2024-07-30 PROCEDURE — 99203 OFFICE O/P NEW LOW 30 MIN: CPT | Mod: 95 | Performed by: NURSE PRACTITIONER

## 2024-07-30 ASSESSMENT — PAIN SCALES - GENERAL: PAINLEVEL: NO PAIN (0)

## 2024-07-30 NOTE — PATIENT INSTRUCTIONS
UROLOGY CLINIC VISIT PATIENT INSTRUCTIONS    Will check another urinalysis, as well as a urine cytology test, later this week. Will be in touch with you regarding results and next steps.     If you have any issues, questions or concerns in the meantime, do not hesitate to contact us at [phone] or via [employer].     Charis Holley, CNP  Department of Urology

## 2024-07-30 NOTE — PROGRESS NOTES
Virtual Visit Details    Type of service:  Video Visit   Originating Location (pt. Location): Home  Distant Location (provider location):  Off-site  Platform used for Video Visit: Radha

## 2024-07-30 NOTE — PROGRESS NOTES
Video start time: 9:23 AM  Video end time: 9:38 AM    CC: Microscopic hematuria    Assessment/Plan:  59 year old female with microhematuria on one UA last month (3 RBCs), though with a negative repeat test (only 2 RBCs). CT urogram largely unremarkable from a urinary tract standpoint. We discussed the definition of true microhematuria and the fact that she had one abnormal UA followed by one normal one. We discussed the possibility of atrophic vaginitis in a post-menopausal female resulting in minor microhematuria. Discussed the option to pursue a cystoscopy in order to complete her hematuria workup versus ongoing monitoring. She has a physical later this week with her PCP, so shared decision made today to first pursue a repeat UA at that time. Will add a cytology test with this while we are collecting a sample. If negative for true microhematuria, will the recommend ongoing monitoring, with a repeat UA in 6 months to 1 year. If positive for microhematuria, will then recommend moving forward with the cystoscopy.   -UA and cytology later this week and next steps based on these results, per above.     Charis Holley, CNP  Department of Urology      Subjective:   59 year old female with PMH of low bone density who presents for virtual consult regarding microhematuria. UA from one month ago showed 3 RBCs, while a repeat from two weeks ago showed only 2 RBCs. CT urogram completed last week and was largely unremarkable from a urinary standpoint.     Today, she states that she has not seen any blood in her urine. She notes that last year, she had some pain in her left pelvic area. Had a pelvic ultrasound that showed prominent pelvic vascular structures, especially in the left adnexa, though no masses. She has not noticed any vaginal bleeding.     Hematuria Risk Factors:  Age >40: Yes  Smoking history: Briefly in college  Occupational exposure to chemicals or dyes (ie, benzenes, aromatic amines): No  History of urologic  disorder or disease: No  History of irritative voiding symptoms: No  History of urinary tract infection: No  Analgesic abuse: No  History of pelvic irradiation: No    Objective:     Exam:   Patient is a 59 year old female   No vital signs obtained due to virtual visit.  General Appearance: Well groomed, hygenic  Respiratory: No cough, no respiratory distress or labored breathing  Musculoskeletal: Grossly normal with no gross deficits  Skin: No discoloration or apparent rashes  Neurologic: No tremors  Psychiatric: Alert and oriented  Further examination is deferred due to the nature of our visit.

## 2024-07-30 NOTE — LETTER
7/30/2024       RE: Jesus Anglin  [address] [id number]     Dear Colleague,    Thank you for referring your patient, Jesus Anglin, to the [facility] [place] at [facility]. Please see a copy of my visit note below.    Video start time: 9:23 AM  Video end time: 9:38 AM    CC: Microscopic hematuria    Assessment/Plan:  59 year old female with microhematuria on one UA last month (3 RBCs), though with a negative repeat test (only 2 RBCs). CT urogram largely unremarkable from a urinary tract standpoint. We discussed the definition of true microhematuria and the fact that she had one abnormal UA followed by one normal one. We discussed the possibility of atrophic vaginitis in a post-menopausal female resulting in minor microhematuria. Discussed the option to pursue a cystoscopy in order to complete her hematuria workup versus ongoing monitoring. She has a physical later this week with her PCP, so shared decision made today to first pursue a repeat UA at that time. Will add a cytology test with this while we are collecting a sample. If negative for true microhematuria, will the recommend ongoing monitoring, with a repeat UA in 6 months to 1 year. If positive for microhematuria, will then recommend moving forward with the cystoscopy.   -UA and cytology later this week and next steps based on these results, per above.     Charis Holley, CNP  Department of Urology      Subjective:   59 year old female with PMH of low bone density who presents for virtual consult regarding microhematuria. UA from one month ago showed 3 RBCs, while a repeat from two weeks ago showed only 2 RBCs. CT urogram completed last week and was largely unremarkable from a urinary standpoint.     Today, she states that she has not seen any blood in her urine. She notes that last year, she had some pain in her left pelvic area. Had a pelvic ultrasound  that showed prominent pelvic vascular structures, especially in the left adnexa, though no masses. She has not noticed any vaginal bleeding.     Hematuria Risk Factors:  Age >40: Yes  Smoking history: Briefly in college  Occupational exposure to chemicals or dyes (ie, benzenes, aromatic amines): No  History of urologic disorder or disease: No  History of irritative voiding symptoms: No  History of urinary tract infection: No  Analgesic abuse: No  History of pelvic irradiation: No    Objective:     Exam:   Patient is a 59 year old female   No vital signs obtained due to virtual visit.  General Appearance: Well groomed, hygenic  Respiratory: No cough, no respiratory distress or labored breathing  Musculoskeletal: Grossly normal with no gross deficits  Skin: No discoloration or apparent rashes  Neurologic: No tremors  Psychiatric: Alert and oriented  Further examination is deferred due to the nature of our visit.             Virtual Visit Details    Type of service:  Video Visit   Originating Location (pt. Location): Home  Distant Location (provider location):  Off-site  Platform used for Video Visit: AmWell      Again, thank you for allowing me to participate in the care of your patient.      Sincerely,    Charis Holley, CNP

## 2024-07-30 NOTE — NURSING NOTE
Current patient location: [address] [place] [id number]    Is the patient currently in the state of [place]? YES    Visit mode:VIDEO    If the visit is dropped, the patient can be reconnected by: VIDEO VISIT: Text to cell phone:   Telephone Information:   Mobile [phone]       Will anyone else be joining the visit? NO  (If patient encounters technical issues they should call [phone]    How would you like to obtain your AVS? MyChart    Are changes needed to the allergy or medication list? No    Are refills needed on medications prescribed by this physician? NO    Reason for visit: Consult ([facility] )    Stefanie DELVALLE

## 2024-08-01 ENCOUNTER — OFFICE VISIT (OUTPATIENT)
Dept: FAMILY MEDICINE | Facility: CLINIC | Age: 59
End: 2024-08-01
Payer: COMMERCIAL

## 2024-08-01 ENCOUNTER — TELEPHONE (OUTPATIENT)
Dept: FAMILY MEDICINE | Facility: CLINIC | Age: 59
End: 2024-08-01

## 2024-08-01 VITALS
SYSTOLIC BLOOD PRESSURE: 116 MMHG | WEIGHT: 135 LBS | BODY MASS INDEX: 23.05 KG/M2 | DIASTOLIC BLOOD PRESSURE: 74 MMHG | OXYGEN SATURATION: 98 % | HEART RATE: 61 BPM | HEIGHT: 64 IN | RESPIRATION RATE: 16 BRPM | TEMPERATURE: 97.5 F

## 2024-08-01 DIAGNOSIS — Z00.00 ROUTINE GENERAL MEDICAL EXAMINATION AT A HEALTH CARE FACILITY: Primary | ICD-10-CM

## 2024-08-01 DIAGNOSIS — Z12.11 SCREEN FOR COLON CANCER: ICD-10-CM

## 2024-08-01 DIAGNOSIS — Z23 NEED FOR VACCINATION: ICD-10-CM

## 2024-08-01 DIAGNOSIS — E83.52 HYPERCALCEMIA: ICD-10-CM

## 2024-08-01 DIAGNOSIS — E78.2 MODERATE MIXED HYPERLIPIDEMIA NOT REQUIRING STATIN THERAPY: ICD-10-CM

## 2024-08-01 DIAGNOSIS — Z13.228 SCREENING FOR METABOLIC DISORDER: ICD-10-CM

## 2024-08-01 DIAGNOSIS — R31.29 MICROHEMATURIA: ICD-10-CM

## 2024-08-01 LAB
ALBUMIN UR-MCNC: NEGATIVE MG/DL
APPEARANCE UR: CLEAR
BILIRUB UR QL STRIP: NEGATIVE
COLOR UR AUTO: NORMAL
GLUCOSE UR STRIP-MCNC: NEGATIVE MG/DL
HGB UR QL STRIP: NEGATIVE
KETONES UR STRIP-MCNC: NEGATIVE MG/DL
LEUKOCYTE ESTERASE UR QL STRIP: NEGATIVE
NITRATE UR QL: NEGATIVE
PH UR STRIP: 6 [PH] (ref 5–7)
RBC URINE: <1 /HPF
SP GR UR STRIP: 1.01 (ref 1–1.03)
UROBILINOGEN UR STRIP-MCNC: NORMAL MG/DL
WBC URINE: <1 /HPF

## 2024-08-01 PROCEDURE — 88112 CYTOPATH CELL ENHANCE TECH: CPT | Mod: TC,ORL | Performed by: NURSE PRACTITIONER

## 2024-08-01 PROCEDURE — 88112 CYTOPATH CELL ENHANCE TECH: CPT | Mod: 26

## 2024-08-01 PROCEDURE — 81001 URINALYSIS AUTO W/SCOPE: CPT | Mod: ORL | Performed by: NURSE PRACTITIONER

## 2024-08-01 SDOH — HEALTH STABILITY: PHYSICAL HEALTH: ON AVERAGE, HOW MANY MINUTES DO YOU ENGAGE IN EXERCISE AT THIS LEVEL?: 60 MIN

## 2024-08-01 SDOH — HEALTH STABILITY: PHYSICAL HEALTH: ON AVERAGE, HOW MANY DAYS PER WEEK DO YOU ENGAGE IN MODERATE TO STRENUOUS EXERCISE (LIKE A BRISK WALK)?: 6 DAYS

## 2024-08-01 ASSESSMENT — SOCIAL DETERMINANTS OF HEALTH (SDOH): HOW OFTEN DO YOU GET TOGETHER WITH FRIENDS OR RELATIVES?: ONCE A WEEK

## 2024-08-01 NOTE — NURSING NOTE
"59 year old  Chief Complaint   Patient presents with    Physical     No concerns.       Blood pressure 116/74, pulse 61, temperature 97.5  F (36.4  C), temperature source Skin, resp. rate 16, height 1.622 m (5' 3.86\"), weight 61.2 kg (135 lb), SpO2 98%, not currently breastfeeding. Body mass index is 23.28 kg/m .  Patient Active Problem List   Diagnosis    Lentigo maligna (H)    Benign neoplasm of choroid    Nevus of choroid of right eye    Drusen stage macular degeneration    Rosacea    Hyperlipidemia    Low bone density    Microhematuria       Wt Readings from Last 2 Encounters:   08/01/24 61.2 kg (135 lb)   07/01/24 60.8 kg (134 lb 1.3 oz)     BP Readings from Last 3 Encounters:   08/01/24 116/74   07/01/24 111/71   06/20/24 121/70         Current Outpatient Medications   Medication Sig Dispense Refill    Cyanocobalamin (B-12 PO)       metroNIDAZOLE (METROCREAM) 0.75 % external cream Apply to face 1-2 times a day. 45 g 11    Multiple Vitamins-Minerals (MULTI COMPLETE PO)       nystatin (MYCOSTATIN) 025906 UNIT/GM external ointment Apply topically 2 times daily as needed to affected area 30 g 0    tretinoin (RETIN-A) 0.025 % external cream Apply to face every night 45 g 11    erythromycin (ROMYCIN) 5 MG/GM ophthalmic ointment Place 0.5 inches Into the left eye 3 times daily 1 g 0     No current facility-administered medications for this visit.       Social History     Tobacco Use    Smoking status: Never    Smokeless tobacco: Never   Substance Use Topics    Alcohol use: Yes     Alcohol/week: 2.0 standard drinks of alcohol     Types: 2 Standard drinks or equivalent per week    Drug use: Never       Health Maintenance Due   Topic Date Due    RSV VACCINE (Pregnancy & 60+) (1 - 1-dose 60+ series) Never done    DTAP/TDAP/TD IMMUNIZATION (2 - Td or Tdap) 04/04/2024    LIPID  07/20/2024    YEARLY PREVENTIVE VISIT  07/20/2024    COLORECTAL CANCER SCREENING  08/29/2024       Lab Results   Component Value Date    PAP NIL " 09/07/2018     Prior to immunization administration, verified patients identity using patient s name and date of birth. Please see Immunization Activity for additional information.     Screening Questionnaire for Adult Immunization    Are you sick today?   No   Do you have allergies to medications, food, a vaccine component or latex?   No   Have you ever had a serious reaction after receiving a vaccination?   No   Do you have a long-term health problem with heart, lung, kidney, or metabolic disease (e.g., diabetes), asthma, a blood disorder, no spleen, complement component deficiency, a cochlear implant, or a spinal fluid leak?  Are you on long-term aspirin therapy?   No   Do you have cancer, leukemia, HIV/AIDS, or any other immune system problem?   No   Do you have a parent, brother, or sister with an immune system problem?   No   In the past 3 months, have you taken medications that affect  your immune system, such as prednisone, other steroids, or anticancer drugs; drugs for the treatment of rheumatoid arthritis, Crohn s disease, or psoriasis; or have you had radiation treatments?   No   Have you had a seizure, or a brain or other nervous system problem?   No   During the past year, have you received a transfusion of blood or blood    products, or been given immune (gamma) globulin or antiviral drug?   No   For women: Are you pregnant or is there a chance you could become       pregnant during the next month?   No   Have you received any vaccinations in the past 4 weeks?   No     Immunization questionnaire answers were all negative.      Patient instructed to remain in clinic for 15 minutes afterwards, and to report any adverse reactions.     Screening performed by Lesley Frost LPN on 8/1/2024 at 4:43 PM.          August 1, 2024 3:55 PM

## 2024-08-01 NOTE — PATIENT INSTRUCTIONS
Good to see you today!    Schedule an appointment for fasting labs. I will contact you with results when available.    Schedule your colonoscopy and mammogram.  Flu, RSV and COVID shots this fall.    Follow-up in 1 year, sooner with concerns.    Patient Education   Preventive Care Advice   This is general advice given by our system to help you stay healthy. However, your care team may have specific advice just for you. Please talk to your care team about your preventive care needs.  Nutrition  Eat 5 or more servings of fruits and vegetables each day.  Try wheat bread, brown rice and whole grain pasta (instead of white bread, rice, and pasta).  Get enough calcium and vitamin D. Check the label on foods and aim for 100% of the RDA (recommended daily allowance).  Lifestyle  Exercise at least 150 minutes each week  (30 minutes a day, 5 days a week).  Do muscle strengthening activities 2 days a week. These help control your weight and prevent disease.  No smoking.  Wear sunscreen to prevent skin cancer.  Have a dental exam and cleaning every 6 months.  Yearly exams  See your health care team every year to talk about:  Any changes in your health.  Any medicines your care team has prescribed.  Preventive care, family planning, and ways to prevent chronic diseases.  Shots (vaccines)   HPV shots (up to age 26), if you've never had them before.  Hepatitis B shots (up to age 59), if you've never had them before.  COVID-19 shot: Get this shot when it's due.  Flu shot: Get a flu shot every year.  Tetanus shot: Get a tetanus shot every 10 years.  Pneumococcal, hepatitis A, and RSV shots: Ask your care team if you need these based on your risk.  Shingles shot (for age 50 and up)  General health tests  Diabetes screening:  Starting at age 35, Get screened for diabetes at least every 3 years.  If you are younger than age 35, ask your care team if you should be screened for diabetes.  Cholesterol test: At age 39, start having a  cholesterol test every 5 years, or more often if advised.  Bone density scan (DEXA): At age 50, ask your care team if you should have this scan for osteoporosis (brittle bones).  Hepatitis C: Get tested at least once in your life.  STIs (sexually transmitted infections)  Before age 24: Ask your care team if you should be screened for STIs.  After age 24: Get screened for STIs if you're at risk. You are at risk for STIs (including HIV) if:  You are sexually active with more than one person.  You don't use condoms every time.  You or a partner was diagnosed with a sexually transmitted infection.  If you are at risk for HIV, ask about PrEP medicine to prevent HIV.  Get tested for HIV at least once in your life, whether you are at risk for HIV or not.  Cancer screening tests  Cervical cancer screening: If you have a cervix, begin getting regular cervical cancer screening tests starting at age 21.  Breast cancer scan (mammogram): If you've ever had breasts, begin having regular mammograms starting at age 40. This is a scan to check for breast cancer.  Colon cancer screening: It is important to start screening for colon cancer at age 45.  Have a colonoscopy test every 10 years (or more often if you're at risk) Or, ask your provider about stool tests like a FIT test every year or Cologuard test every 3 years.  To learn more about your testing options, visit:   .  For help making a decision, visit:   https://bit.ly/ea78940.  Prostate cancer screening test: If you have a prostate, ask your care team if a prostate cancer screening test (PSA) at age 55 is right for you.  Lung cancer screening: If you are a current or former smoker ages 50 to 80, ask your care team if ongoing lung cancer screenings are right for you.  For informational purposes only. Not to replace the advice of your health care provider. Copyright   2023 [employer]. All rights reserved. Clinically reviewed by the [facility] Transitions  Program. [employer] 434017 - REV 01/24.

## 2024-08-01 NOTE — PROGRESS NOTES
Preventive Care Visit  [facility]  Harini Alvarez MD, Family Medicine  Aug 1, 2024      Assessment & Plan     Jesus was seen today for physical.    Diagnoses and all orders for this visit:    Routine general medical examination at a health care facility    Moderate mixed hyperlipidemia not requiring statin therapy  -     Lipid panel reflex to direct LDL Non-fasting; Future    Screen for colon cancer  -     Colonoscopy Screening  Referral; Future    Need for vaccination  -     TDAP 10-64Y (ADACEL,BOOSTRIX)    Screening for metabolic disorder  -     Basic metabolic panel; Future    Microhematuria  -     Routine UA with microscopic - No culture  -     Cytology, non-gynecologic      Recheck fasting labs  Microhematuria work-up per urology  Osteopenia - due for repeat DEXA in 2026      Counseling  Appropriate preventive services were addressed with this patient via screening, questionnaire, or discussion as appropriate for fall prevention, nutrition, physical activity, Tobacco-use cessation, weight loss and cognition.  Checklist reviewing preventive services available has been given to the patient.  Reviewed patient's diet, addressing concerns and/or questions.       Return in about 53 weeks (around 8/7/2025) for Annual Wellness Visit.    Subjective   Jesus is a 59 year old, presenting for the following:  Physical (No concerns.)         HPI    Gynecological history:  Menstrual/PMS/menopausal symptoms: postmenopausal  Last Pap:  7/2023, result Normal  History of abnormal Paps: no    Other health-related habits:  Physical activity:  walking, classes, weight training  Mood: has been good, some work stress    Health Maintenance Due   Topic Date Due    RSV VACCINE (Pregnancy & 60+) (1 - 1-dose 60+ series) Never done    LIPID  07/20/2024    COLORECTAL CANCER SCREENING  08/29/2024 8/1/2024   General Health   How would you rate your overall physical health? Good   Feel stress (tense, anxious, or  unable to sleep) To some extent      (!) STRESS CONCERN      8/1/2024   Nutrition   Three or more servings of calcium each day? Yes   Diet: Vegetarian/vegan   How many servings of fruit and vegetables per day? 4 or more   How many sweetened beverages each day? 0-1            8/1/2024   Exercise   Days per week of moderate/strenous exercise 6 days   Average minutes spent exercising at this level 60 min            8/1/2024   Social Factors   Frequency of gathering with friends or relatives Once a week   Worry food won't last until get money to buy more No   Food not last or not have enough money for food? No   Do you have housing? (Housing is defined as stable permanent housing and does not include staying ouside in a car, in a tent, in an abandoned building, in an overnight shelter, or couch-surfing.) Yes   Are you worried about losing your housing? No   Lack of transportation? No   Unable to get utilities (heat,electricity)? No            8/1/2024   Fall Risk   Fallen 2 or more times in the past year? No   Trouble with walking or balance? No             8/1/2024   Dental   Dentist two times every year? Yes            7/27/2024   TB Screening   Were you born outside of the [place]? No          Today's PHQ-2 Score:       7/30/2024     9:16 AM   PHQ-2 ( 1999 Pf[employer])   Q1: Little interest or pleasure in doing things 0   Q2: Feeling down, depressed or hopeless 0   PHQ-2 Score 0         8/1/2024   Substance Use   Alcohol more than 3/day or more than 7/wk No   Do you use any other substances recreationally? No        Social History     Tobacco Use    Smoking status: Never    Smokeless tobacco: Never   Substance Use Topics    Alcohol use: Yes     Alcohol/week: 2.0 standard drinks of alcohol     Types: 2 Standard drinks or equivalent per week    Drug use: Never           10/13/2023   LAST FHS-7 RESULTS   1st degree relative breast or ovarian cancer No    No   Any relative bilateral breast cancer No    No   Any male have breast  "cancer No    No   Any ONE woman have BOTH breast AND ovarian cancer No    No   Any woman with breast cancer before 50yrs No    No   2 or more relatives with breast AND/OR ovarian cancer No    No   2 or more relatives with breast AND/OR bowel cancer No    No       Multiple values from one day are sorted in reverse-chronological order     Mammogram Screening - Mammogram every 1-2 years updated in Health Maintenance based on mutual decision making        8/1/2024   STI Screening   New sexual partner(s) since last STI/HIV test? No        History of abnormal Pap smear: No - age 30- 64 PAP with HPV every 5 years recommended        Latest Ref Rng & Units 7/20/2023     2:46 PM 9/7/2018     4:38 PM 9/7/2018     4:10 PM   PAP / HPV   PAP  Negative for Intraepithelial Lesion or Malignancy (NILM)      PAP (Historical)    NIL    HPV 16 DNA Negative Negative  Negative     HPV 18 DNA Negative Negative  Negative     Other HR HPV Negative Negative  Negative       ASCVD Risk   The 10-year ASCVD risk score (Solomon RUIZ, et al., 2019) is: 3%    Values used to calculate the score:      Age: 59 years      Sex: Female      Is Non- : No      Diabetic: No      Tobacco smoker: No      Systolic Blood Pressure: 116 mmHg      Is BP treated: No      HDL Cholesterol: 60 mg/dL      Total Cholesterol: 247 mg/dL      Reviewed and updated as needed this visit by Provider   Tobacco  Allergies  Meds  Problems  Med Hx  Surg Hx  Fam Hx  Soc   Hx Sexual Activity             Objective    Exam  /74 (BP Location: Left arm, Patient Position: Sitting, Cuff Size: Adult Regular)   Pulse 61   Temp 97.5  F (36.4  C) (Skin)   Resp 16   Ht 1.622 m (5' 3.86\")   Wt 61.2 kg (135 lb)   LMP  (LMP Unknown)   SpO2 98%   BMI 23.28 kg/m     Estimated body mass index is 23.28 kg/m  as calculated from the following:    Height as of this encounter: 1.622 m (5' 3.86\").    Weight as of this encounter: 61.2 kg (135 " lb).    Physical Exam  GENERAL: alert and no distress  EYES: Eyes grossly normal to inspection, PERRL and conjunctivae and sclerae normal  HENT: ear canals and TM's normal, nose and mouth without ulcers or lesions  NECK: no adenopathy, no asymmetry, masses, or scars  RESP: lungs clear to auscultation - no rales, rhonchi or wheezes  CV: regular rate and rhythm, normal S1 S2, no S3 or S4, no murmur, click or rub, no peripheral edema  ABDOMEN: soft, nontender, no hepatosplenomegaly, no masses and bowel sounds normal  MS: no gross musculoskeletal defects noted, no edema  SKIN: no suspicious lesions or rashes  NEURO: Normal strength and tone, mentation intact and speech normal  PSYCH: mentation appears normal, affect normal/bright        Signed Electronically by: Harini Alvarez MD

## 2024-08-05 DIAGNOSIS — R31.29 MICROHEMATURIA: Primary | ICD-10-CM

## 2024-08-05 LAB
PATH REPORT.COMMENTS IMP SPEC: NORMAL
PATH REPORT.FINAL DX SPEC: NORMAL
PATH REPORT.GROSS SPEC: NORMAL
PATH REPORT.MICROSCOPIC SPEC OTHER STN: NORMAL
PATH REPORT.RELEVANT HX SPEC: NORMAL

## 2024-08-16 ENCOUNTER — LAB (OUTPATIENT)
Dept: LAB | Facility: CLINIC | Age: 59
End: 2024-08-16
Payer: COMMERCIAL

## 2024-08-16 DIAGNOSIS — Z13.228 SCREENING FOR METABOLIC DISORDER: ICD-10-CM

## 2024-08-16 DIAGNOSIS — E78.2 MODERATE MIXED HYPERLIPIDEMIA NOT REQUIRING STATIN THERAPY: ICD-10-CM

## 2024-08-16 LAB
ANION GAP SERPL CALCULATED.3IONS-SCNC: 10 MMOL/L (ref 7–15)
BUN SERPL-MCNC: 13.1 MG/DL (ref 8–23)
CALCIUM SERPL-MCNC: 11 MG/DL (ref 8.8–10.4)
CHLORIDE SERPL-SCNC: 101 MMOL/L (ref 98–107)
CHOLEST SERPL-MCNC: 245 MG/DL
CREAT SERPL-MCNC: 0.74 MG/DL (ref 0.51–0.95)
EGFRCR SERPLBLD CKD-EPI 2021: >90 ML/MIN/1.73M2
FASTING STATUS PATIENT QL REPORTED: YES
FASTING STATUS PATIENT QL REPORTED: YES
GLUCOSE SERPL-MCNC: 90 MG/DL (ref 70–99)
HCO3 SERPL-SCNC: 28 MMOL/L (ref 22–29)
HDLC SERPL-MCNC: 51 MG/DL
LDLC SERPL CALC-MCNC: 161 MG/DL
NONHDLC SERPL-MCNC: 194 MG/DL
POTASSIUM SERPL-SCNC: 4.3 MMOL/L (ref 3.4–5.3)
SODIUM SERPL-SCNC: 139 MMOL/L (ref 135–145)
TRIGL SERPL-MCNC: 164 MG/DL

## 2024-08-16 PROCEDURE — 83718 ASSAY OF LIPOPROTEIN: CPT | Mod: ORL | Performed by: FAMILY MEDICINE

## 2024-08-16 PROCEDURE — 80048 BASIC METABOLIC PNL TOTAL CA: CPT | Mod: ORL | Performed by: FAMILY MEDICINE

## 2024-08-16 PROCEDURE — 82465 ASSAY BLD/SERUM CHOLESTEROL: CPT | Mod: ORL | Performed by: FAMILY MEDICINE

## 2024-09-06 ENCOUNTER — LAB (OUTPATIENT)
Dept: LAB | Facility: CLINIC | Age: 59
End: 2024-09-06
Payer: COMMERCIAL

## 2024-09-06 DIAGNOSIS — E83.52 HYPERCALCEMIA: ICD-10-CM

## 2024-09-06 LAB
CALCIUM SERPL-MCNC: 9.2 MG/DL (ref 8.8–10.4)
PTH-INTACT SERPL-MCNC: 40 PG/ML (ref 15–65)

## 2024-09-06 PROCEDURE — 83970 ASSAY OF PARATHORMONE: CPT | Mod: ORL | Performed by: FAMILY MEDICINE

## 2024-09-06 PROCEDURE — 82310 ASSAY OF CALCIUM: CPT | Mod: ORL | Performed by: FAMILY MEDICINE

## 2024-09-10 ENCOUNTER — MYC MEDICAL ADVICE (OUTPATIENT)
Dept: FAMILY MEDICINE | Facility: CLINIC | Age: 59
End: 2024-09-10

## 2024-09-10 DIAGNOSIS — E83.52 HYPERCALCEMIA: Primary | ICD-10-CM

## 2024-09-19 ENCOUNTER — TELEPHONE (OUTPATIENT)
Dept: GASTROENTEROLOGY | Facility: CLINIC | Age: 59
End: 2024-09-19
Payer: COMMERCIAL

## 2024-09-19 NOTE — TELEPHONE ENCOUNTER
"Endoscopy Scheduling Screen    Have you had any respiratory illness or flu-like symptoms in the last 10 days?  No    What is your communication preference for Instructions and/or Bowel Prep?   MyChart    What insurance is in the chart?  Other:      Ordering/Referring Provider:   STACIE STROUD    (If ordering provider performs procedure, schedule with ordering provider unless otherwise instructed. )    BMI: Estimated body mass index is 23.28 kg/m  as calculated from the following:    Height as of 8/1/24: 1.622 m (5' 3.86\").    Weight as of 8/1/24: 61.2 kg (135 lb).     Sedation Ordered  moderate sedation.   If patient BMI > 50 do not schedule in [facility].    If patient BMI > 45 do not schedule at [facility].    Are you taking methadone or Suboxone?  NO, No RN review required.    Have you been diagnosed and are being treated for severe PTSD or severe anxiety?  NO, No RN review required.    Are you taking any prescription medications for pain 3 or more times per week?   NO, No RN review required.    Do you have a history of malignant hyperthermia?  No    (Females) Are you currently pregnant?   No     Have you been diagnosed or told you have pulmonary hypertension?   No    Do you have an LVAD?  No    Have you been told you have moderate to severe sleep apnea?  No.    Have you been told you have COPD, asthma, or any other lung disease?  No    Do you have any heart conditions?  No     Have you ever had or are you waiting for an organ transplant?  No. Continue scheduling, no site restrictions.    Have you had a stroke or transient ischemic attack (TIA aka \"mini stroke\" in the last 6 months?   No    Have you been diagnosed with or been told you have cirrhosis of the liver?   No.    Are you currently on dialysis?   No    Do you need assistance transferring?   No    BMI: Estimated body mass index is 23.28 kg/m  as calculated from the following:    Height as of 8/1/24: 1.622 m (5' 3.86\").    Weight as of 8/1/24: 61.2 kg (135 lb). "     Is patients BMI > 40 and scheduling location [facility]?  No    Do you take an injectable or oral medication for weight loss or diabetes (excluding insulin)?  No    Do you take the medication Naltrexone?  No    Do you take blood thinners?  No       Prep   Are you currently on dialysis or do you have chronic kidney disease?  No    Do you have a diagnosis of diabetes?  No    Do you have a diagnosis of cystic fibrosis (CF)?  No    On a regular basis do you go 3 -5 days between bowel movements?  No    BMI > 40?  No    Preferred Pharmacy:    [id number] [place] [address] [address]  Phone: [phone] Fax: [phone]      Final Scheduling Details     Procedure scheduled  Colonoscopy    Surgeon:       Date of procedure:  10/21     Pre-OP / PAC:   No - Not required for this site.    Location  [facility] - Patient preference.    Sedation   Moderate Sedation - Per order.      Patient Reminders:   You will receive a call from a Nurse to review instructions and health history.  This assessment must be completed prior to your procedure.  Failure to complete the Nurse assessment may result in the procedure being cancelled.      On the day of your procedure, please designate an adult(s) who can drive you home stay with you for the next 24 hours. The medicines used in the exam will make you sleepy. You will not be able to drive.      You cannot take public transportation, ride share services, or non-medical taxi service without a responsible caregiver.  Medical transport services are allowed with the requirement that a responsible caregiver will receive you at your destination.  We require that drivers and caregivers are confirmed prior to your procedure.

## 2024-10-08 ENCOUNTER — TELEPHONE (OUTPATIENT)
Dept: GASTROENTEROLOGY | Facility: CLINIC | Age: 59
End: 2024-10-08
Payer: COMMERCIAL

## 2024-10-08 NOTE — TELEPHONE ENCOUNTER
Attempted to contact patient in order to complete pre assessment questions.     Patient scheduled for Colonoscopy on 10/21/24.     No answer. Left message to return call to [phone] option 2.    Callback required communication sent via [employer].    Jessica Ott RN  Endoscopy Procedure Pre Assessment

## 2024-10-08 NOTE — TELEPHONE ENCOUNTER
Pre visit planning completed.      Procedure details:    Patient scheduled for Colonoscopy on 10/21/24.     Arrival time: 0845. Procedure time 0945    Facility location: [facility]; [address] [place] [address], 5th Floor, [place] [address]. Check in location: 5th Floor.    Sedation type: Conscious sedation     Pre op exam needed? No.    Indication for procedure: Screening      Chart review:     Electronic implanted devices? No    Recent diagnosis of diverticulitis within the last 6 weeks? No      Medication review:    Diabetic? No    Anticoagulants? No    Weight loss medication/injectable? No GLP-1 medication per patient's medication list.  RN will verify with pre-assessment call.    Other medication HOLDING recommendations:  N/A      Prep for procedure:     Bowel prep recommendation: Standard Miralax  Due to: standard bowel prep.    Prep instructions sent via [employer]         Becky Varela RN  Endoscopy Procedure Pre Assessment RN  [phone] option 2

## 2024-10-08 NOTE — TELEPHONE ENCOUNTER
Pre assessment completed for upcoming procedure.   (Please see previous telephone encounter notes for complete details)    Patient  returned call.       Procedure details:    Arrival time and facility location reviewed.    Pre op exam needed? No.    Designated  policy reviewed. Instructed to have someone stay 6  hours post procedure.       Medication review:    Medications reviewed. Please see supporting documentation below. Holding recommendations discussed (if applicable).   N/A      Prep for procedure:     Procedure prep instructions reviewed.        Any additional information needed:  N/A      Patient  verbalized understanding and had no questions or concerns at this time.      Becky Priest RN  Endoscopy Procedure Pre Assessment   [phone] option 2

## 2024-10-18 ENCOUNTER — ANCILLARY PROCEDURE (OUTPATIENT)
Dept: MAMMOGRAPHY | Facility: CLINIC | Age: 59
End: 2024-10-18
Attending: FAMILY MEDICINE
Payer: COMMERCIAL

## 2024-10-18 DIAGNOSIS — Z12.31 VISIT FOR SCREENING MAMMOGRAM: ICD-10-CM

## 2024-10-18 PROCEDURE — 77067 SCR MAMMO BI INCL CAD: CPT | Performed by: RADIOLOGY

## 2024-10-18 PROCEDURE — 77063 BREAST TOMOSYNTHESIS BI: CPT | Performed by: RADIOLOGY

## 2024-10-21 ENCOUNTER — HOSPITAL ENCOUNTER (OUTPATIENT)
Facility: AMBULATORY SURGERY CENTER | Age: 59
Discharge: HOME | End: 2024-10-21
Attending: INTERNAL MEDICINE | Admitting: INTERNAL MEDICINE
Payer: COMMERCIAL

## 2024-10-21 VITALS
SYSTOLIC BLOOD PRESSURE: 116 MMHG | WEIGHT: 128 LBS | HEART RATE: 50 BPM | HEIGHT: 64 IN | BODY MASS INDEX: 21.85 KG/M2 | DIASTOLIC BLOOD PRESSURE: 67 MMHG | OXYGEN SATURATION: 98 % | RESPIRATION RATE: 16 BRPM | TEMPERATURE: 98 F

## 2024-10-21 LAB — COLONOSCOPY: NORMAL

## 2024-10-21 PROCEDURE — 45378 DIAGNOSTIC COLONOSCOPY: CPT | Mod: 33 | Performed by: INTERNAL MEDICINE

## 2024-10-21 RX ORDER — ONDANSETRON 2 MG/ML
4 INJECTION INTRAMUSCULAR; INTRAVENOUS
Status: DISCONTINUED | OUTPATIENT
Start: 2024-10-21 | End: 2024-10-21 | Stop reason: HOSPADM

## 2024-10-21 RX ORDER — NALOXONE HYDROCHLORIDE 0.4 MG/ML
0.4 INJECTION, SOLUTION INTRAMUSCULAR; INTRAVENOUS; SUBCUTANEOUS
Status: DISCONTINUED | OUTPATIENT
Start: 2024-10-21 | End: 2024-10-22 | Stop reason: HOSPADM

## 2024-10-21 RX ORDER — ONDANSETRON 2 MG/ML
4 INJECTION INTRAMUSCULAR; INTRAVENOUS EVERY 6 HOURS PRN
Status: DISCONTINUED | OUTPATIENT
Start: 2024-10-21 | End: 2024-10-22 | Stop reason: HOSPADM

## 2024-10-21 RX ORDER — PROCHLORPERAZINE MALEATE 10 MG
10 TABLET ORAL EVERY 6 HOURS PRN
Status: DISCONTINUED | OUTPATIENT
Start: 2024-10-21 | End: 2024-10-22 | Stop reason: HOSPADM

## 2024-10-21 RX ORDER — NALOXONE HYDROCHLORIDE 0.4 MG/ML
0.2 INJECTION, SOLUTION INTRAMUSCULAR; INTRAVENOUS; SUBCUTANEOUS
Status: DISCONTINUED | OUTPATIENT
Start: 2024-10-21 | End: 2024-10-22 | Stop reason: HOSPADM

## 2024-10-21 RX ORDER — FENTANYL CITRATE 50 UG/ML
INJECTION, SOLUTION INTRAMUSCULAR; INTRAVENOUS DAILY PRN
Status: DISCONTINUED | OUTPATIENT
Start: 2024-10-21 | End: 2024-10-21 | Stop reason: HOSPADM

## 2024-10-21 RX ORDER — ONDANSETRON 4 MG/1
4 TABLET, ORALLY DISINTEGRATING ORAL EVERY 6 HOURS PRN
Status: DISCONTINUED | OUTPATIENT
Start: 2024-10-21 | End: 2024-10-22 | Stop reason: HOSPADM

## 2024-10-21 RX ORDER — FLUMAZENIL 0.1 MG/ML
0.2 INJECTION, SOLUTION INTRAVENOUS
Status: ACTIVE | OUTPATIENT
Start: 2024-10-21 | End: 2024-10-21

## 2024-10-21 RX ORDER — LIDOCAINE 40 MG/G
CREAM TOPICAL
Status: DISCONTINUED | OUTPATIENT
Start: 2024-10-21 | End: 2024-10-21 | Stop reason: HOSPADM

## 2024-10-21 NOTE — H&P
Jesus TONEY Linnette  [id number]  female  59 year old      Reason for procedure/surgery: screening      Patient Active Problem List   Diagnosis    Lentigo maligna (H)    Benign neoplasm of choroid    Nevus of choroid of right eye    Drusen stage macular degeneration    Rosacea    Hyperlipidemia    Low bone density    Microhematuria       Past Surgical History:    Past Surgical History:   Procedure Laterality Date     SECTION      MOHS MICROGRAPHIC PROCEDURE      REPAIR MOHS         Past Medical History:   Past Medical History:   Diagnosis Date    Benign neoplasm of choroid 2012    Drusen stage macular degeneration 2018    Lentigo maligna (H)     Low bone density 2023    Mixed hyperlipidemia     Nevus of choroid of right eye 2018    Rosacea 2022       Social History:   Social History     Tobacco Use    Smoking status: Never    Smokeless tobacco: Never   Substance Use Topics    Alcohol use: Yes     Alcohol/week: 2.0 standard drinks of alcohol     Types: 2 Standard drinks or equivalent per week       Family History:   Family History   Problem Relation Age of Onset    Lipids Mother     Hypertension Mother     Skin Cancer Mother         BCC and SCC    Diabetes Father     Hypertension Father     Hyperlipidemia Father     Melanoma Sister     Diabetes Sister     Cerebrovascular Disease Maternal Grandmother     C.A.D. Maternal Grandfather         MI age 52    Cerebrovascular Disease Paternal Grandmother     Breast Cancer No family hx of     Ovarian Cancer No family hx of     Colon Cancer No family hx of        Allergies:   Allergies   Allergen Reactions    Penicillin G Hives    Benzoyl Peroxide Swelling and Rash       Active Medications:   Current Outpatient Medications   Medication Sig Dispense Refill    COLLAGEN MATRIX, BOVINE, EX       Cranberry 125 MG TABS       Cyanocobalamin (B-12 PO)       metroNIDAZOLE (METROCREAM) 0.75 % external cream Apply to face 1-2 times a day. 45 g 11     Multiple Vitamins-Minerals (MULTI COMPLETE PO)       nystatin (MYCOSTATIN) 800208 UNIT/GM external ointment Apply topically 2 times daily as needed to affected area 30 g 0    tretinoin (RETIN-A) 0.025 % external cream Apply to face every night 45 g 11       Systemic Review:   CONSTITUTIONAL: NEGATIVE for fever, chills, change in weight  ENT/MOUTH: NEGATIVE for ear, mouth and throat problems  RESP: NEGATIVE for significant cough or SOB  CV: NEGATIVE for chest pain, palpitations or peripheral edema    Physical Examination:   Vital Signs: LMP  (LMP Unknown)   GENERAL: healthy, alert and no distress  NECK: no adenopathy, no asymmetry, masses, or scars  RESP: lungs clear to auscultation - no rales, rhonchi or wheezes  CV: regular rate and rhythm, normal S1 S2, no S3 or S4, no murmur, click or rub, no peripheral edema and peripheral pulses strong  ABDOMEN: soft, nontender, no hepatosplenomegaly, no masses and bowel sounds normal  MS: no gross musculoskeletal defects noted, no edema    Plan: Appropriate to proceed as scheduled.      Rosario Sun MD  10/21/2024    PCP:  Dot Clayton

## 2024-11-26 ENCOUNTER — OFFICE VISIT (OUTPATIENT)
Dept: DERMATOLOGY | Facility: CLINIC | Age: 59
End: 2024-11-26
Attending: DERMATOLOGY
Payer: COMMERCIAL

## 2024-11-26 DIAGNOSIS — L57.8 ACTINIC SKIN DAMAGE: ICD-10-CM

## 2024-11-26 DIAGNOSIS — Z85.820 HISTORY OF MELANOMA: Primary | ICD-10-CM

## 2024-11-26 DIAGNOSIS — L82.1 SK (SEBORRHEIC KERATOSIS): ICD-10-CM

## 2024-11-26 DIAGNOSIS — L71.9 ROSACEA: ICD-10-CM

## 2024-11-26 DIAGNOSIS — D22.9 MULTIPLE NEVI: ICD-10-CM

## 2024-11-26 RX ORDER — TRETINOIN 0.25 MG/G
CREAM TOPICAL
Qty: 45 G | Refills: 11 | Status: SHIPPED | OUTPATIENT
Start: 2024-11-26

## 2024-11-26 ASSESSMENT — PAIN SCALES - GENERAL: PAINLEVEL_OUTOF10: NO PAIN (0)

## 2024-11-26 NOTE — LETTER
11/26/2024       RE: Jesus Anglin  [address] [address]     Dear Colleague,    Thank you for referring your patient, Jesus Anglin, to the [facility]ERMAT[facility] [place] at [facility]. Please see a copy of my visit note below.    [facility] Dermatology Note  Encounter Date: Nov 26, 2024  Office Visit     Dermatology Problem List:  # Lentigo maligna, left nasal ala  - Mohs excision 8/2010 with reconstruction by plastics and surface treatments by Dr. Martinez in the year after surgery.  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  No change in 5 mm nevus of right plantar foot with no atypia on dermoscopy    # Benign findings: seborrheic keratoses, cherry angiomas, sebaceous hyperplasia, lentigo    # History of lentigo maligna left nasal ala- NERD.     # Rosacea  - continue topical metronidazole and tretinoin      Follow-up: 1 year(s) in-person, or earlier for new or changing lesions    Staff:     Anna Quinn MD  ____________________________________________    CC: Skin Check (FBSE- No areas of concern. )    HPI:  Ms. Jesus Anglin is a(n) 59 year old female who presents today as a return patient for a skin check.  Doing well with no tender or bleeding lesions.  Rosacea well controlled with topical metronidazole and tretinoin.     Patient is otherwise feeling well, without additional skin concerns.     Labs Reviewed:  N/A    Physical Exam:  Vitals: LMP  (LMP Unknown)   SKIN:  Total skin excluding the undergarment areas was performed. The exam included the head/face, neck, both arms, chest, back, abdomen, both legs, digits and/or nails.   - nevi- none with atypia. Nevus right plantar foot 5 mm with no atypia on dermoscopy  - lentigo with no atypia  - cherry angiomas  - waxy stuck on papules  - scar of nose with NERD  - papule right cheek with yellow globules on dermoscopy  - no cervical or submandibular adenopathy   - No other lesions of concern on areas examined.     Medications:  Current Outpatient Medications   Medication Sig Dispense Refill     COLLAGEN MATRIX, BOVINE, EX        Cyanocobalamin (B-12 PO)        metroNIDAZOLE (METROCREAM) 0.75 % external cream Apply to face 1-2 times a day. 45 g 11     Multiple Vitamins-Minerals (MULTI COMPLETE PO)        nystatin (MYCOSTATIN) 800686 UNIT/GM external ointment Apply topically 2 times daily as needed to affected area 30 g 0     tretinoin (RETIN-A) 0.025 % external cream Apply to face every night 45 g 11     Cranberry 125 MG TABS        No current facility-administered medications for this visit.      Past Medical History:   Patient Active Problem List   Diagnosis     Lentigo maligna (H)     Benign neoplasm of choroid     Nevus of choroid of right eye     Drusen stage macular degeneration     Rosacea     Hyperlipidemia     Low bone density     Microhematuria     Past Medical History:   Diagnosis Date     Benign neoplasm of choroid 09/24/2012     Drusen stage macular degeneration 12/06/2018     Lentigo maligna (H)      Low bone density 09/18/2023     Mixed hyperlipidemia      Nevus of choroid of right eye 12/06/2018     Rosacea 07/07/2022       CC Anna Quinn MD  [address]  [place] [address] on close of this encounter.      Again, thank you for allowing me to participate in the care of your patient.      Sincerely,    Anna Quinn MD

## 2024-11-26 NOTE — NURSING NOTE
Dermatology Rooming Note    Jesus Anglin's goals for this visit include:   Chief Complaint   Patient presents with    Skin Check     FBSE- No areas of concern.      Bhaskar Elliott, EMT  Clinic Support  [facility]     [phone]    Employed by [employer]

## 2024-11-26 NOTE — PROGRESS NOTES
[facility] Dermatology Note  Encounter Date: Nov 26, 2024  Office Visit     Dermatology Problem List:  # Lentigo maligna, left nasal ala  - Mohs excision 8/2010 with reconstruction by plastics and surface treatments by Dr. Martinez in the year after surgery.  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  No change in 5 mm nevus of right plantar foot with no atypia on dermoscopy    # Benign findings: seborrheic keratoses, cherry angiomas, sebaceous hyperplasia, lentigo    # History of lentigo maligna left nasal ala- NERD.     # Rosacea  - continue topical metronidazole and tretinoin      Follow-up: 1 year(s) in-person, or earlier for new or changing lesions    Staff:     Anna Quinn MD  ____________________________________________    CC: Skin Check (FBSE- No areas of concern. )    HPI:  Ms. Jesus Anglin is a(n) 59 year old female who presents today as a return patient for a skin check.  Doing well with no tender or bleeding lesions.  Rosacea well controlled with topical metronidazole and tretinoin.     Patient is otherwise feeling well, without additional skin concerns.     Labs Reviewed:  N/A    Physical Exam:  Vitals: LMP  (LMP Unknown)   SKIN: Total skin excluding the undergarment areas was performed. The exam included the head/face, neck, both arms, chest, back, abdomen, both legs, digits and/or nails.   - nevi- none with atypia. Nevus right plantar foot 5 mm with no atypia on dermoscopy  - lentigo with no atypia  - cherry angiomas  - waxy stuck on papules  -  scar of nose with NERD  - papule right cheek with yellow globules on dermoscopy  - no cervical or submandibular adenopathy   - No other lesions of concern on areas examined.     Medications:  Current Outpatient Medications   Medication Sig Dispense Refill    COLLAGEN MATRIX, BOVINE, EX       Cyanocobalamin (B-12 PO)       metroNIDAZOLE (METROCREAM) 0.75 % external cream Apply to face 1-2 times a day. 45 g 11    Multiple Vitamins-Minerals (MULTI COMPLETE PO)       nystatin (MYCOSTATIN) 605098 UNIT/GM external ointment Apply topically 2 times daily as needed to affected area 30 g 0    tretinoin (RETIN-A) 0.025 % external cream Apply to face every night 45 g 11    Cranberry 125 MG TABS        No current facility-administered medications for this visit.      Past Medical History:   Patient Active Problem List   Diagnosis    Lentigo maligna (H)    Benign neoplasm of choroid    Nevus of choroid of right eye    Drusen stage macular degeneration    Rosacea    Hyperlipidemia    Low bone density    Microhematuria     Past Medical History:   Diagnosis Date    Benign neoplasm of choroid 09/24/2012    Drusen stage macular degeneration 12/06/2018    Lentigo maligna (H)     Low bone density 09/18/2023    Mixed hyperlipidemia     Nevus of choroid of right eye 12/06/2018    Rosacea 07/07/2022       CC Anna Quinn MD  [address]  [place] [address] on close of this encounter.

## 2024-12-23 ENCOUNTER — TELEPHONE (OUTPATIENT)
Dept: FAMILY MEDICINE | Facility: CLINIC | Age: 59
End: 2024-12-23

## 2024-12-23 NOTE — TELEPHONE ENCOUNTER
"Pt  calling to report she sustained burn injury to left index finger and part of her thumb. She was making caramel and the pan broke and spilled on her hand. She was able to remove the caramel and put her hand immediately under cold water.  reports that her skin is starting to \"bubble up\". Advised  to take her to urgent care so that they can dress it properly and discussed they may prescribed antibiotics just in case. He confirms understanding and will take her to nearest urgent care clinic.    Douglas BECKMAN, RN  12/23/24 3:31 PM    "

## 2025-01-17 PROCEDURE — 82310 ASSAY OF CALCIUM: CPT | Mod: ORL | Performed by: FAMILY MEDICINE

## 2025-01-20 ENCOUNTER — OFFICE VISIT (OUTPATIENT)
Dept: FAMILY MEDICINE | Facility: CLINIC | Age: 60
End: 2025-01-20
Payer: COMMERCIAL

## 2025-01-20 VITALS
RESPIRATION RATE: 16 BRPM | BODY MASS INDEX: 22.83 KG/M2 | OXYGEN SATURATION: 99 % | WEIGHT: 133 LBS | DIASTOLIC BLOOD PRESSURE: 83 MMHG | HEART RATE: 62 BPM | TEMPERATURE: 98.2 F | SYSTOLIC BLOOD PRESSURE: 135 MMHG

## 2025-01-20 DIAGNOSIS — R10.2 ADNEXAL TENDERNESS, LEFT: Primary | ICD-10-CM

## 2025-01-20 NOTE — PATIENT INSTRUCTIONS
ASSESSMENT and PLAN:     Jesus is a 59 yo with history of vascular swelling in the Left adnexal region noted 1.5 years ago. Symptoms had resolved but have now returned.     PLAN   Reviewed notes of Dr. Clayton for possible vascular surgery referral at some point. Symptoms have returned but are not severe. We discussed referral now but elected for repeat pelvic ultrasound and then referral if needed.  Call [phone] to schedule imaging tests at the [facility].   Informed that I will be away for the next 2 weeks, but that Dr. Clayton or our nurses can assist if needed    Daryl Michael MD  Family Medicine and Sports Medicine  [facility]

## 2025-01-20 NOTE — PROGRESS NOTES
ASSESSMENT and PLAN:     Jesus is a 61 yo with history of vascular swelling in the Left adnexal region noted 1.5 years ago. Symptoms had resolved but have now returned.     PLAN   Reviewed notes of Dr. Clayton for possible vascular surgery referral at some point. Symptoms have returned but are not severe. We discussed referral now but elected for repeat pelvic ultrasound and then referral if needed.  Call [phone] to schedule imaging tests at the [facility].   Informed that I will be away for the next 2 weeks, but that Dr. Clayton or our nurses can assist if needed    Daryl Michael MD  Family Medicine and Sports Medicine  [facility]      Medical assistant intake:  Jesus Anglin is a 60 year old female who presents to [facility] today for Abdominal Pain (Stomach pain -- LLQ started last year, was off and on. Imagine ordered by Dr. Clayton -- varicose veins around uterus. Notes the pain is getting worse lately -- waking up due to pain, stomach sleeper.)      SUBJECTIVE:   Jesus   Is a 60-year-old female who is here due to a dull ache in the lower left quadrant of her abdomen.  She had this started about 2 years ago and had an evaluation by Dr. Clayton.  a CT scan was nondiagnostic but a pelvic ultrasound showed some distended   Vascular structures in the left adnexal region.   her symptoms then decreased for the better part of the last year and a half but then increased again about a month ago.  States that it is a dull ache but fairly constant.  Does not seem to be changed by food intake.  She is eating and drinking well.  Normal bowel and bladder.    No fever or sweats or chills.    Review Of Systems:    See subjective.   Has otherwise been in usual state of health, e.g.   Cardiovascular: negative  Respiratory: No shortness of breath, dyspnea on exertion, cough, or hemoptysis  Gastrointestinal: negative  Genitourinary: negative    Problem list per EMR:  Patient Active  Problem List   Diagnosis    Lentigo maligna (H)    Benign neoplasm of choroid    Nevus of choroid of right eye    Drusen stage macular degeneration    Rosacea    Hyperlipidemia    Low bone density    Microhematuria       Current Outpatient Medications   Medication Sig Dispense Refill    COLLAGEN MATRIX, BOVINE, EX       Cyanocobalamin (B-12 PO)       metroNIDAZOLE (METROCREAM) 0.75 % external cream Apply to face 1-2 times a day. 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     OBJECTIVE    Vitals: /83 (BP Location: Left arm, Patient Position: Sitting, Cuff Size: Adult Regular)   Pulse 62   Temp 98.2  F (36.8  C) (Temporal)   Resp 16   Wt 60.3 kg (133 lb)   LMP  (LMP Unknown)   SpO2 99%   BMI 22.83 kg/m    BMI= Body mass index is 22.83 kg/m .    Appears well and in no distress.    Cardiovascular-regular rate and rhythm without murmurs.    Lungs are clear to auscultation throughout.    Abdomen is non-nontender except for with deep palpation in the left lower quadrant.  No masses are felt.  Normal bowel sounds.  No guarding. no rebound tenderness.    Reviewed CT of Abdomen and Pelvis in Aug. 2023 - Normal.  Then had Ultrasound of Pelvis with the following   IMPRESSION: Prominent pelvic vascular structures especially in the  left adnexa as noted on recent CT examination. No mass lesions.    SEE TOP OF NOTE FOR ASSESSMENT AND PLAN  30 minutes spent on the date of the encounter doing chart review, history and exam, documentation and further activities as noted in the note.     --Daryl Michael MD  [facility]  [place], Department of Family Medicine and [facility]

## 2025-01-20 NOTE — NURSING NOTE
60 year old  Chief Complaint   Patient presents with    Abdominal Pain     Stomach pain -- LLQ started last year, was off and on. Imagine ordered by Dr. Clayton -- varicose veins around uterus. Notes the pain is getting worse lately -- waking up due to pain, stomach sleeper.       Blood pressure 135/83, pulse 62, temperature 98.2  F (36.8  C), temperature source Temporal, resp. rate 16, weight 60.3 kg (133 lb), SpO2 99%, not currently breastfeeding. Body mass index is 22.83 kg/m .  Patient Active Problem List   Diagnosis    Lentigo maligna (H)    Benign neoplasm of choroid    Nevus of choroid of right eye    Drusen stage macular degeneration    Rosacea    Hyperlipidemia    Low bone density    Microhematuria       Wt Readings from Last 2 Encounters:   01/20/25 60.3 kg (133 lb)   10/21/24 58.1 kg (128 lb)     BP Readings from Last 3 Encounters:   01/20/25 135/83   10/21/24 116/67   08/01/24 116/74         Current Outpatient Medications   Medication Sig Dispense Refill    COLLAGEN MATRIX, BOVINE, EX       Cyanocobalamin (B-12 PO)       metroNIDAZOLE (METROCREAM) 0.75 % external cream Apply to face 1-2 times a day. 45 g 11    Multiple Vitamins-Minerals (MULTI COMPLETE PO)       nystatin (MYCOSTATIN) 874387 UNIT/GM external ointment APPLY TOPICALLY 2 TIMES DAILY AS NEEDED TO AFFECTED AREA 30 g 0    tretinoin (RETIN-A) 0.025 % external cream Apply to face every night 45 g 11     No current facility-administered medications for this visit.       Social History     Tobacco Use    Smoking status: Never    Smokeless tobacco: Never   Substance Use Topics    Alcohol use: Yes     Alcohol/week: 2.0 standard drinks of alcohol     Types: 2 Standard drinks or equivalent per week    Drug use: Never       Health Maintenance Due   Topic Date Due    Pneumococcal Vaccine: 50+ Years (1 of 1 - PCV) Never done    INFLUENZA VACCINE (1) 09/01/2024    PHQ-2 (once per calendar year)  01/01/2025       Lab Results   Component Value Date    PAP NIL  09/07/2018 January 20, 2025 9:38 AM

## 2025-01-24 ENCOUNTER — ANCILLARY PROCEDURE (OUTPATIENT)
Dept: ULTRASOUND IMAGING | Facility: CLINIC | Age: 60
End: 2025-01-24
Attending: FAMILY MEDICINE
Payer: COMMERCIAL

## 2025-01-24 DIAGNOSIS — R10.2 ADNEXAL TENDERNESS, LEFT: ICD-10-CM

## 2025-01-24 PROCEDURE — 76856 US EXAM PELVIC COMPLETE: CPT | Performed by: RADIOLOGY

## 2025-01-24 PROCEDURE — 76830 TRANSVAGINAL US NON-OB: CPT | Performed by: RADIOLOGY

## 2025-08-01 SDOH — HEALTH STABILITY: PHYSICAL HEALTH: ON AVERAGE, HOW MANY DAYS PER WEEK DO YOU ENGAGE IN MODERATE TO STRENUOUS EXERCISE (LIKE A BRISK WALK)?: 5 DAYS

## 2025-08-01 SDOH — HEALTH STABILITY: PHYSICAL HEALTH: ON AVERAGE, HOW MANY MINUTES DO YOU ENGAGE IN EXERCISE AT THIS LEVEL?: 60 MIN

## 2025-08-01 ASSESSMENT — SOCIAL DETERMINANTS OF HEALTH (SDOH): HOW OFTEN DO YOU GET TOGETHER WITH FRIENDS OR RELATIVES?: ONCE A WEEK

## 2025-08-06 ENCOUNTER — OFFICE VISIT (OUTPATIENT)
Dept: FAMILY MEDICINE | Facility: CLINIC | Age: 60
End: 2025-08-06
Payer: COMMERCIAL

## 2025-08-06 VITALS
SYSTOLIC BLOOD PRESSURE: 118 MMHG | TEMPERATURE: 98 F | WEIGHT: 130.12 LBS | HEIGHT: 64 IN | HEART RATE: 60 BPM | DIASTOLIC BLOOD PRESSURE: 76 MMHG | BODY MASS INDEX: 22.21 KG/M2 | OXYGEN SATURATION: 97 %

## 2025-08-06 DIAGNOSIS — R91.8 MULTIPLE PULMONARY NODULES: ICD-10-CM

## 2025-08-06 DIAGNOSIS — M85.9 LOW BONE DENSITY: ICD-10-CM

## 2025-08-06 DIAGNOSIS — R31.29 MICROHEMATURIA: ICD-10-CM

## 2025-08-06 DIAGNOSIS — E78.2 MODERATE MIXED HYPERLIPIDEMIA NOT REQUIRING STATIN THERAPY: ICD-10-CM

## 2025-08-06 DIAGNOSIS — Z00.00 ROUTINE GENERAL MEDICAL EXAMINATION AT A HEALTH CARE FACILITY: Primary | ICD-10-CM

## 2025-08-06 DIAGNOSIS — R23.8 SKIN IRRITATION: ICD-10-CM

## 2025-08-06 LAB
ALBUMIN UR-MCNC: NEGATIVE MG/DL
ANION GAP SERPL CALCULATED.3IONS-SCNC: 10 MMOL/L (ref 7–15)
APPEARANCE UR: CLEAR
BILIRUB UR QL STRIP: NEGATIVE
BUN SERPL-MCNC: 16.4 MG/DL (ref 8–23)
CALCIUM SERPL-MCNC: 9.5 MG/DL (ref 8.8–10.4)
CHLORIDE SERPL-SCNC: 104 MMOL/L (ref 98–107)
CHOLEST SERPL-MCNC: 230 MG/DL
COLOR UR AUTO: YELLOW
CREAT SERPL-MCNC: 0.73 MG/DL (ref 0.51–0.95)
EGFRCR SERPLBLD CKD-EPI 2021: >90 ML/MIN/1.73M2
FASTING STATUS PATIENT QL REPORTED: YES
FASTING STATUS PATIENT QL REPORTED: YES
GLUCOSE SERPL-MCNC: 82 MG/DL (ref 70–99)
GLUCOSE UR STRIP-MCNC: NEGATIVE MG/DL
HCO3 SERPL-SCNC: 28 MMOL/L (ref 22–29)
HDLC SERPL-MCNC: 49 MG/DL
HGB UR QL STRIP: ABNORMAL
KETONES UR STRIP-MCNC: NEGATIVE MG/DL
LDLC SERPL CALC-MCNC: 156 MG/DL
LEUKOCYTE ESTERASE UR QL STRIP: NEGATIVE
MUCOUS THREADS #/AREA URNS LPF: PRESENT /LPF
NITRATE UR QL: NEGATIVE
NONHDLC SERPL-MCNC: 181 MG/DL
PH UR STRIP: 6.5 [PH] (ref 5–7)
POTASSIUM SERPL-SCNC: 4.2 MMOL/L (ref 3.4–5.3)
RBC URINE: 2 /HPF
SODIUM SERPL-SCNC: 142 MMOL/L (ref 135–145)
SP GR UR STRIP: 1.03 (ref 1–1.03)
TRANSITIONAL EPI: <1 /HPF
TRIGL SERPL-MCNC: 126 MG/DL
UROBILINOGEN UR STRIP-MCNC: NORMAL MG/DL
VIT D+METAB SERPL-MCNC: 54 NG/ML (ref 20–50)
WBC URINE: 1 /HPF

## 2025-08-06 PROCEDURE — 80061 LIPID PANEL: CPT | Mod: ORL | Performed by: FAMILY MEDICINE

## 2025-08-06 PROCEDURE — 80048 BASIC METABOLIC PNL TOTAL CA: CPT | Mod: ORL | Performed by: FAMILY MEDICINE

## 2025-08-06 PROCEDURE — 82306 VITAMIN D 25 HYDROXY: CPT | Mod: ORL | Performed by: FAMILY MEDICINE

## 2025-08-06 PROCEDURE — 81001 URINALYSIS AUTO W/SCOPE: CPT | Mod: ORL | Performed by: NURSE PRACTITIONER

## 2025-08-06 RX ORDER — ERGOCALCIFEROL (VITAMIN D2) 10 MCG
1 TABLET ORAL DAILY
COMMUNITY

## 2025-08-06 RX ORDER — NYSTATIN 100000 U/G
OINTMENT TOPICAL
Qty: 30 G | Refills: 0 | Status: SHIPPED | OUTPATIENT
Start: 2025-08-06

## (undated) DEVICE — TUBING SUCTION MEDI-VAC 1/4"X20' N620A

## (undated) DEVICE — SUCTION MANIFOLD NEPTUNE 2 SYS 1 PORT 702-025-000

## (undated) DEVICE — KIT ENDO TURNOVER/PROCEDURE CARRY-ON 101822

## (undated) DEVICE — KIT ENDO FIRST STEP DISINFECTANT 200ML W/POUCH EP-4

## (undated) DEVICE — GOWN IMPERVIOUS 2XL BLUE

## (undated) DEVICE — SPECIMEN CONTAINER 3OZ W/FORMALIN 59901

## (undated) DEVICE — SOL WATER IRRIG 500ML BOTTLE 2F7113

## (undated) RX ORDER — FENTANYL CITRATE 50 UG/ML
INJECTION, SOLUTION INTRAMUSCULAR; INTRAVENOUS
Status: DISPENSED
Start: 2024-10-21